# Patient Record
Sex: FEMALE | Race: WHITE | Employment: UNEMPLOYED | URBAN - METROPOLITAN AREA
[De-identification: names, ages, dates, MRNs, and addresses within clinical notes are randomized per-mention and may not be internally consistent; named-entity substitution may affect disease eponyms.]

---

## 2020-10-13 ENCOUNTER — TRANSCRIBE ORDERS (OUTPATIENT)
Dept: ADMINISTRATIVE | Facility: HOSPITAL | Age: 60
End: 2020-10-13

## 2020-10-13 DIAGNOSIS — N28.9 KIDNEY DISEASE: Primary | ICD-10-CM

## 2020-10-19 ENCOUNTER — HOSPITAL ENCOUNTER (OUTPATIENT)
Dept: ULTRASOUND IMAGING | Facility: HOSPITAL | Age: 60
Discharge: HOME | End: 2020-10-19
Payer: COMMERCIAL

## 2020-10-19 DIAGNOSIS — N28.9 KIDNEY DISEASE: ICD-10-CM

## 2020-10-19 PROCEDURE — 51798 US URINE CAPACITY MEASURE: CPT

## 2021-04-06 DIAGNOSIS — Z23 ENCOUNTER FOR IMMUNIZATION: ICD-10-CM

## 2021-04-13 ENCOUNTER — IMMUNIZATIONS (OUTPATIENT)
Dept: FAMILY MEDICINE CLINIC | Facility: HOSPITAL | Age: 61
End: 2021-04-13

## 2021-04-13 DIAGNOSIS — Z23 ENCOUNTER FOR IMMUNIZATION: Primary | ICD-10-CM

## 2021-04-13 PROCEDURE — 91300 SARS-COV-2 / COVID-19 MRNA VACCINE (PFIZER-BIONTECH) 30 MCG: CPT

## 2021-04-13 PROCEDURE — 0001A SARS-COV-2 / COVID-19 MRNA VACCINE (PFIZER-BIONTECH) 30 MCG: CPT

## 2021-05-05 ENCOUNTER — IMMUNIZATIONS (OUTPATIENT)
Dept: FAMILY MEDICINE CLINIC | Facility: HOSPITAL | Age: 61
End: 2021-05-05

## 2021-05-05 DIAGNOSIS — Z23 ENCOUNTER FOR IMMUNIZATION: Primary | ICD-10-CM

## 2021-05-05 PROCEDURE — 91300 SARS-COV-2 / COVID-19 MRNA VACCINE (PFIZER-BIONTECH) 30 MCG: CPT

## 2021-05-05 PROCEDURE — 0002A SARS-COV-2 / COVID-19 MRNA VACCINE (PFIZER-BIONTECH) 30 MCG: CPT

## 2021-09-20 ENCOUNTER — HOSPITAL ENCOUNTER (OUTPATIENT)
Dept: RADIOLOGY | Facility: HOSPITAL | Age: 61
Discharge: HOME | End: 2021-09-20
Attending: FAMILY MEDICINE
Payer: COMMERCIAL

## 2021-09-20 DIAGNOSIS — J40 BRONCHITIS: ICD-10-CM

## 2021-09-20 PROCEDURE — 71046 X-RAY EXAM CHEST 2 VIEWS: CPT

## 2023-01-18 ENCOUNTER — APPOINTMENT (EMERGENCY)
Dept: RADIOLOGY | Facility: HOSPITAL | Age: 63
End: 2023-01-18

## 2023-01-18 ENCOUNTER — HOSPITAL ENCOUNTER (INPATIENT)
Facility: HOSPITAL | Age: 63
LOS: 2 days | Discharge: HOME | End: 2023-01-22
Attending: EMERGENCY MEDICINE | Admitting: INTERNAL MEDICINE

## 2023-01-18 DIAGNOSIS — R93.89 ABNORMAL CT OF THE CHEST: ICD-10-CM

## 2023-01-18 DIAGNOSIS — E87.1 HYPONATREMIA: Primary | ICD-10-CM

## 2023-01-18 DIAGNOSIS — F20.9 SCHIZOPHRENIA (HCC): ICD-10-CM

## 2023-01-18 DIAGNOSIS — I10 BENIGN ESSENTIAL HYPERTENSION: ICD-10-CM

## 2023-01-18 DIAGNOSIS — E83.42 HYPOMAGNESEMIA: ICD-10-CM

## 2023-01-18 PROBLEM — E78.00 HYPERCHOLESTEROLEMIA: Status: ACTIVE | Noted: 2022-12-13

## 2023-01-18 PROBLEM — E66.01 CLASS 3 SEVERE OBESITY DUE TO EXCESS CALORIES WITHOUT SERIOUS COMORBIDITY WITH BODY MASS INDEX (BMI) OF 40.0 TO 44.9 IN ADULT (HCC): Status: ACTIVE | Noted: 2022-12-13

## 2023-01-18 PROBLEM — E66.813 CLASS 3 SEVERE OBESITY DUE TO EXCESS CALORIES WITHOUT SERIOUS COMORBIDITY WITH BODY MASS INDEX (BMI) OF 40.0 TO 44.9 IN ADULT (HCC): Status: ACTIVE | Noted: 2022-12-13

## 2023-01-18 PROBLEM — M19.90 ARTHRITIS: Status: ACTIVE | Noted: 2020-10-13

## 2023-01-18 PROBLEM — N18.31 STAGE 3A CHRONIC KIDNEY DISEASE (HCC): Status: ACTIVE | Noted: 2020-10-13

## 2023-01-18 LAB
ALBUMIN SERPL BCP-MCNC: 4.2 G/DL (ref 3.5–5)
ALP SERPL-CCNC: 83 U/L (ref 34–104)
ALT SERPL W P-5'-P-CCNC: 11 U/L (ref 7–52)
ANION GAP SERPL CALCULATED.3IONS-SCNC: 10 MMOL/L (ref 4–13)
ANION GAP SERPL CALCULATED.3IONS-SCNC: 12 MMOL/L (ref 4–13)
AST SERPL W P-5'-P-CCNC: 13 U/L (ref 13–39)
ATRIAL RATE: 65 BPM
BACTERIA UR QL AUTO: NORMAL /HPF
BASOPHILS # BLD AUTO: 0.06 THOUSANDS/ÂΜL (ref 0–0.1)
BASOPHILS NFR BLD AUTO: 1 % (ref 0–1)
BILIRUB SERPL-MCNC: 0.43 MG/DL (ref 0.2–1)
BILIRUB UR QL STRIP: NEGATIVE
BUN SERPL-MCNC: 18 MG/DL (ref 5–25)
BUN SERPL-MCNC: 19 MG/DL (ref 5–25)
CALCIUM SERPL-MCNC: 9 MG/DL (ref 8.4–10.2)
CALCIUM SERPL-MCNC: 9.9 MG/DL (ref 8.4–10.2)
CARDIAC TROPONIN I PNL SERPL HS: 4 NG/L
CHLORIDE SERPL-SCNC: 89 MMOL/L (ref 96–108)
CHLORIDE SERPL-SCNC: 92 MMOL/L (ref 96–108)
CLARITY UR: CLEAR
CO2 SERPL-SCNC: 21 MMOL/L (ref 21–32)
CO2 SERPL-SCNC: 21 MMOL/L (ref 21–32)
COLOR UR: YELLOW
CREAT SERPL-MCNC: 1.75 MG/DL (ref 0.6–1.3)
CREAT SERPL-MCNC: 1.76 MG/DL (ref 0.6–1.3)
EOSINOPHIL # BLD AUTO: 0.08 THOUSAND/ÂΜL (ref 0–0.61)
EOSINOPHIL NFR BLD AUTO: 1 % (ref 0–6)
ERYTHROCYTE [DISTWIDTH] IN BLOOD BY AUTOMATED COUNT: 12.8 % (ref 11.6–15.1)
GFR SERPL CREATININE-BSD FRML MDRD: 30 ML/MIN/1.73SQ M
GFR SERPL CREATININE-BSD FRML MDRD: 30 ML/MIN/1.73SQ M
GLUCOSE SERPL-MCNC: 114 MG/DL (ref 65–140)
GLUCOSE SERPL-MCNC: 122 MG/DL (ref 65–140)
GLUCOSE UR STRIP-MCNC: NEGATIVE MG/DL
HCT VFR BLD AUTO: 37.6 % (ref 34.8–46.1)
HGB BLD-MCNC: 13 G/DL (ref 11.5–15.4)
HGB UR QL STRIP.AUTO: ABNORMAL
IMM GRANULOCYTES # BLD AUTO: 0.03 THOUSAND/UL (ref 0–0.2)
IMM GRANULOCYTES NFR BLD AUTO: 0 % (ref 0–2)
KETONES UR STRIP-MCNC: NEGATIVE MG/DL
LEUKOCYTE ESTERASE UR QL STRIP: NEGATIVE
LYMPHOCYTES # BLD AUTO: 1.56 THOUSANDS/ÂΜL (ref 0.6–4.47)
LYMPHOCYTES NFR BLD AUTO: 16 % (ref 14–44)
MAGNESIUM SERPL-MCNC: 1.6 MG/DL (ref 1.9–2.7)
MCH RBC QN AUTO: 28.5 PG (ref 26.8–34.3)
MCHC RBC AUTO-ENTMCNC: 34.6 G/DL (ref 31.4–37.4)
MCV RBC AUTO: 83 FL (ref 82–98)
MONOCYTES # BLD AUTO: 0.79 THOUSAND/ÂΜL (ref 0.17–1.22)
MONOCYTES NFR BLD AUTO: 8 % (ref 4–12)
NEUTROPHILS # BLD AUTO: 7.01 THOUSANDS/ÂΜL (ref 1.85–7.62)
NEUTS SEG NFR BLD AUTO: 74 % (ref 43–75)
NITRITE UR QL STRIP: NEGATIVE
NON-SQ EPI CELLS URNS QL MICRO: NORMAL /HPF
NRBC BLD AUTO-RTO: 0 /100 WBCS
OSMOLALITY UR/SERPL-RTO: 262 MMOL/KG (ref 282–298)
OSMOLALITY UR: 186 MMOL/KG
P AXIS: 40 DEGREES
PH UR STRIP.AUTO: 6 [PH]
PHOSPHATE SERPL-MCNC: 3.6 MG/DL (ref 2.3–4.1)
PLATELET # BLD AUTO: 341 THOUSANDS/UL (ref 149–390)
PLATELET # BLD AUTO: 414 THOUSANDS/UL (ref 149–390)
PMV BLD AUTO: 8.7 FL (ref 8.9–12.7)
PMV BLD AUTO: 8.7 FL (ref 8.9–12.7)
POTASSIUM SERPL-SCNC: 3.5 MMOL/L (ref 3.5–5.3)
POTASSIUM SERPL-SCNC: 4.2 MMOL/L (ref 3.5–5.3)
PR INTERVAL: 138 MS
PROT SERPL-MCNC: 7.4 G/DL (ref 6.4–8.4)
PROT UR STRIP-MCNC: NEGATIVE MG/DL
QRS AXIS: -12 DEGREES
QRSD INTERVAL: 152 MS
QT INTERVAL: 450 MS
QTC INTERVAL: 468 MS
RBC # BLD AUTO: 4.56 MILLION/UL (ref 3.81–5.12)
RBC #/AREA URNS AUTO: NORMAL /HPF
SODIUM 24H UR-SCNC: 9 MOL/L
SODIUM SERPL-SCNC: 122 MMOL/L (ref 135–147)
SODIUM SERPL-SCNC: 123 MMOL/L (ref 135–147)
SP GR UR STRIP.AUTO: <=1.005 (ref 1–1.03)
T WAVE AXIS: 32 DEGREES
TSH SERPL DL<=0.05 MIU/L-ACNC: 1.99 UIU/ML (ref 0.45–4.5)
URATE SERPL-MCNC: 4.5 MG/DL (ref 2–7.5)
UROBILINOGEN UR QL STRIP.AUTO: 0.2 E.U./DL
VENTRICULAR RATE: 65 BPM
WBC # BLD AUTO: 9.53 THOUSAND/UL (ref 4.31–10.16)
WBC #/AREA URNS AUTO: NORMAL /HPF

## 2023-01-18 RX ORDER — OLANZAPINE 2.5 MG/1
7.5 TABLET ORAL
Status: DISCONTINUED | OUTPATIENT
Start: 2023-01-18 | End: 2023-01-22 | Stop reason: HOSPADM

## 2023-01-18 RX ORDER — ONDANSETRON 2 MG/ML
4 INJECTION INTRAMUSCULAR; INTRAVENOUS EVERY 6 HOURS PRN
Status: DISCONTINUED | OUTPATIENT
Start: 2023-01-18 | End: 2023-01-22 | Stop reason: HOSPADM

## 2023-01-18 RX ORDER — FLUOXETINE HYDROCHLORIDE 40 MG/1
40 CAPSULE ORAL DAILY
COMMUNITY
End: 2023-01-22

## 2023-01-18 RX ORDER — MAGNESIUM SULFATE HEPTAHYDRATE 40 MG/ML
2 INJECTION, SOLUTION INTRAVENOUS ONCE
Status: COMPLETED | OUTPATIENT
Start: 2023-01-18 | End: 2023-01-18

## 2023-01-18 RX ORDER — ENOXAPARIN SODIUM 100 MG/ML
60 INJECTION SUBCUTANEOUS 2 TIMES DAILY
Status: DISCONTINUED | OUTPATIENT
Start: 2023-01-18 | End: 2023-01-18

## 2023-01-18 RX ORDER — FLUOXETINE HYDROCHLORIDE 20 MG/1
20 CAPSULE ORAL DAILY
Status: DISCONTINUED | OUTPATIENT
Start: 2023-01-19 | End: 2023-01-21

## 2023-01-18 RX ORDER — METOPROLOL SUCCINATE 100 MG/1
100 TABLET, EXTENDED RELEASE ORAL DAILY
COMMUNITY

## 2023-01-18 RX ORDER — ACETAMINOPHEN 325 MG/1
650 TABLET ORAL EVERY 6 HOURS PRN
Status: DISCONTINUED | OUTPATIENT
Start: 2023-01-18 | End: 2023-01-22 | Stop reason: HOSPADM

## 2023-01-18 RX ORDER — ALPRAZOLAM 0.5 MG/1
1 TABLET ORAL 3 TIMES DAILY PRN
Status: DISCONTINUED | OUTPATIENT
Start: 2023-01-18 | End: 2023-01-22 | Stop reason: HOSPADM

## 2023-01-18 RX ORDER — OLANZAPINE 7.5 MG/1
7.5 TABLET ORAL
COMMUNITY

## 2023-01-18 RX ORDER — ALPRAZOLAM 1 MG/1
1 TABLET ORAL 3 TIMES DAILY PRN
COMMUNITY

## 2023-01-18 RX ORDER — PRAVASTATIN SODIUM 40 MG
40 TABLET ORAL
Status: DISCONTINUED | OUTPATIENT
Start: 2023-01-18 | End: 2023-01-22 | Stop reason: HOSPADM

## 2023-01-18 RX ORDER — METOPROLOL SUCCINATE 100 MG/1
100 TABLET, EXTENDED RELEASE ORAL DAILY
Status: DISCONTINUED | OUTPATIENT
Start: 2023-01-19 | End: 2023-01-22 | Stop reason: HOSPADM

## 2023-01-18 RX ORDER — BENZTROPINE MESYLATE 1 MG/1
0.5 TABLET ORAL 2 TIMES DAILY PRN
Status: DISCONTINUED | OUTPATIENT
Start: 2023-01-18 | End: 2023-01-22 | Stop reason: HOSPADM

## 2023-01-18 RX ORDER — DOCUSATE SODIUM 100 MG/1
100 CAPSULE, LIQUID FILLED ORAL 2 TIMES DAILY
Status: DISCONTINUED | OUTPATIENT
Start: 2023-01-18 | End: 2023-01-22 | Stop reason: HOSPADM

## 2023-01-18 RX ORDER — SODIUM CHLORIDE 9 MG/ML
75 INJECTION, SOLUTION INTRAVENOUS CONTINUOUS
Status: DISCONTINUED | OUTPATIENT
Start: 2023-01-18 | End: 2023-01-19

## 2023-01-18 RX ORDER — AMLODIPINE BESYLATE 10 MG/1
10 TABLET ORAL DAILY
Status: DISCONTINUED | OUTPATIENT
Start: 2023-01-19 | End: 2023-01-19

## 2023-01-18 RX ORDER — MELATONIN
2000 DAILY
COMMUNITY

## 2023-01-18 RX ORDER — ENOXAPARIN SODIUM 100 MG/ML
40 INJECTION SUBCUTANEOUS EVERY 12 HOURS SCHEDULED
Status: DISCONTINUED | OUTPATIENT
Start: 2023-01-18 | End: 2023-01-22 | Stop reason: HOSPADM

## 2023-01-18 RX ADMIN — SODIUM CHLORIDE 75 ML/HR: 0.9 INJECTION, SOLUTION INTRAVENOUS at 15:11

## 2023-01-18 RX ADMIN — ALPRAZOLAM 1 MG: 0.5 TABLET ORAL at 22:58

## 2023-01-18 RX ADMIN — MAGNESIUM SULFATE HEPTAHYDRATE 2 G: 40 INJECTION, SOLUTION INTRAVENOUS at 18:24

## 2023-01-18 RX ADMIN — DOCUSATE SODIUM 100 MG: 100 CAPSULE, LIQUID FILLED ORAL at 18:18

## 2023-01-18 RX ADMIN — ENOXAPARIN SODIUM 40 MG: 40 INJECTION SUBCUTANEOUS at 22:59

## 2023-01-18 RX ADMIN — PRAVASTATIN SODIUM 40 MG: 40 TABLET ORAL at 18:18

## 2023-01-18 RX ADMIN — OLANZAPINE 7.5 MG: 2.5 TABLET, FILM COATED ORAL at 22:58

## 2023-01-18 NOTE — ED PROVIDER NOTES
History  Chief Complaint   Patient presents with   • Abnormal Lab     Pt presents to ed via walk in with complaint of having out pt blood work done and her sodium level being low      61-year-old female comes in for evaluation of abnormal labs  Patient was sent for outpatient labs by nephrologist   Her sodium was reported as 123  Patient states that she was told to come to the emergency department  Patient's only complaint is of muscle cramps but states that this is not new  She denies any headache blurred vision chest pain shortness of breath nausea vomiting or diarrhea  No abdominal pain  Patient states she has had low sodium before but never this low  History provided by:  Patient and medical records   used: No    Medical Problem  Location:  Patient sent in for hyponatremia  Quality:  Low  Severity:  Moderate  Onset quality:  Sudden  Timing:  Constant  Progression:  Worsening  Chronicity:  New  Context:  History of chronic kidney disease  Ineffective treatments:  None tried  Associated symptoms: fatigue and myalgias    Associated symptoms: no abdominal pain, no chest pain, no cough, no ear pain, no fever, no rash, no rhinorrhea, no shortness of breath, no sore throat and no vomiting    Associated symptoms comment:  Muscle cramps      None       Past Medical History:   Diagnosis Date   • Depression    • High cholesterol    • Hypertension    • Psychiatric disorder    • Renal disorder        Past Surgical History:   Procedure Laterality Date   • HYSTERECTOMY         History reviewed  No pertinent family history  I have reviewed and agree with the history as documented      E-Cigarette/Vaping   • E-Cigarette Use Never User      E-Cigarette/Vaping Substances     Social History     Tobacco Use   • Smoking status: Former     Packs/day: 3 00     Types: Cigarettes     Quit date:      Years since quittin 0   • Smokeless tobacco: Never   Vaping Use   • Vaping Use: Never used Substance Use Topics   • Alcohol use: Never   • Drug use: Never       Review of Systems   Constitutional: Positive for fatigue  Negative for chills and fever  HENT: Negative for ear pain, rhinorrhea and sore throat  Eyes: Negative for pain and visual disturbance  Respiratory: Negative for cough and shortness of breath  Cardiovascular: Negative for chest pain and palpitations  Gastrointestinal: Negative for abdominal pain and vomiting  Genitourinary: Negative for dysuria and hematuria  Musculoskeletal: Positive for myalgias  Negative for arthralgias and back pain  Skin: Negative for color change and rash  Neurological: Negative for seizures and syncope  All other systems reviewed and are negative  Physical Exam  Physical Exam  Vitals and nursing note reviewed  Constitutional:       Appearance: Normal appearance  She is well-developed  She is not toxic-appearing  HENT:      Head: Normocephalic and atraumatic  Right Ear: Tympanic membrane and external ear normal       Left Ear: Tympanic membrane and external ear normal       Nose: Nose normal  No nasal deformity or rhinorrhea  Mouth/Throat:      Dentition: Normal dentition  Pharynx: Uvula midline  Eyes:      General: Lids are normal          Right eye: No discharge  Left eye: No discharge  Conjunctiva/sclera: Conjunctivae normal       Pupils: Pupils are equal, round, and reactive to light  Neck:      Vascular: No carotid bruit or JVD  Trachea: Trachea normal    Cardiovascular:      Rate and Rhythm: Normal rate and regular rhythm  No extrasystoles are present  Chest Wall: PMI is not displaced  Pulses: Normal pulses  Pulmonary:      Effort: Pulmonary effort is normal  No accessory muscle usage or respiratory distress  Breath sounds: Normal breath sounds  No wheezing, rhonchi or rales  Abdominal:      General: Bowel sounds are normal       Palpations: Abdomen is soft   Abdomen is not rigid  There is no mass  Tenderness: There is no abdominal tenderness  There is no guarding or rebound  Musculoskeletal:      Right shoulder: No swelling, deformity or bony tenderness  Normal range of motion  Cervical back: Normal range of motion and neck supple  No deformity, tenderness or bony tenderness  Lymphadenopathy:      Cervical: No cervical adenopathy  Skin:     General: Skin is warm and dry  Findings: No rash  Neurological:      Mental Status: She is alert and oriented to person, place, and time  GCS: GCS eye subscore is 4  GCS verbal subscore is 5  GCS motor subscore is 6  Cranial Nerves: No cranial nerve deficit  Sensory: No sensory deficit  Deep Tendon Reflexes: Reflexes are normal and symmetric     Psychiatric:         Speech: Speech normal          Behavior: Behavior normal          Vital Signs  ED Triage Vitals [01/18/23 1410]   Temperature Pulse Respirations Blood Pressure SpO2   97 5 °F (36 4 °C) 65 20 157/58 96 %      Temp Source Heart Rate Source Patient Position - Orthostatic VS BP Location FiO2 (%)   Oral Monitor Sitting Left arm --      Pain Score       No Pain           Vitals:    01/18/23 1410 01/18/23 1515   BP: 157/58 152/84   Pulse: 65 68   Patient Position - Orthostatic VS: Sitting          Visual Acuity      ED Medications  Medications   sodium chloride 0 9 % infusion (75 mL/hr Intravenous New Bag 1/18/23 1511)       Diagnostic Studies  Results Reviewed     Procedure Component Value Units Date/Time    Urine Microscopic [[id number]]  (Normal) Collected: 01/18/23 1444    Lab Status: Final result Specimen: Urine, Clean Catch Updated: 01/18/23 1516     RBC, UA 0-1 /hpf      WBC, UA 0-1 /hpf      Epithelial Cells Occasional /hpf      Bacteria, UA Occasional /hpf     BMP Q8 hours X 3 (Hyponatremia monitoring) [[id number]]     Lab Status: No result Specimen: Blood     UA w Reflex to Microscopic w Reflex to Culture [[id number]]  (Abnormal) Collected: 01/18/23 1444    Lab Status: Final result Specimen: Urine, Clean Catch Updated: 01/18/23 1451     Color, UA Yellow     Clarity, UA Clear     Specific Gravity, UA <=1 005     pH, UA 6 0     Leukocytes, UA Negative     Nitrite, UA Negative     Protein, UA Negative mg/dl      Glucose, UA Negative mg/dl      Ketones, UA Negative mg/dl      Urobilinogen, UA 0 2 E U /dl      Bilirubin, UA Negative     Occult Blood, UA 2+    HS Troponin 0hr (reflex protocol) [[id number]]  (Normal) Collected: 01/18/23 1413    Lab Status: Final result Specimen: Blood from Arm, Right Updated: 01/18/23 1445     hs TnI 0hr 4 ng/L     Comprehensive metabolic panel [[id number]]  (Abnormal) Collected: 01/18/23 1413    Lab Status: Final result Specimen: Blood from Arm, Right Updated: 01/18/23 1440     Sodium 122 mmol/L      Potassium 4 2 mmol/L      Chloride 89 mmol/L      CO2 21 mmol/L      ANION GAP 12 mmol/L      BUN 18 mg/dL      Creatinine 1 76 mg/dL      Glucose 114 mg/dL      Calcium 9 9 mg/dL      AST 13 U/L      ALT 11 U/L      Alkaline Phosphatase 83 U/L      Total Protein 7 4 g/dL      Albumin 4 2 g/dL      Total Bilirubin 0 43 mg/dL      eGFR 30 ml/min/1 73sq m     Narrative:      [place] guidelines for Chronic Kidney Disease (CKD):   •  Stage 1 with normal or high GFR (GFR > 90 mL/min/1 73 square meters)  •  Stage 2 Mild CKD (GFR = 60-89 mL/min/1 73 square meters)  •  Stage 3A Moderate CKD (GFR = 45-59 mL/min/1 73 square meters)  •  Stage 3B Moderate CKD (GFR = 30-44 mL/min/1 73 square meters)  •  Stage 4 Severe CKD (GFR = 15-29 mL/min/1 73 square meters)  •  Stage 5 End Stage CKD (GFR <15 mL/min/1 73 square meters)  Note: GFR calculation is accurate only with a steady state creatinine    Phosphorus [[id number]]  (Normal) Collected: 01/18/23 1413    Lab Status: Final result Specimen: Blood from Arm, Right Updated: 01/18/23 1440     Phosphorus 3 6 mg/dL     Magnesium [[id number]]  (Abnormal) Collected: 01/18/23 1413    Lab Status: Final result Specimen: Blood from Arm, Right Updated: 01/18/23 1440     Magnesium 1 6 mg/dL     CBC and differential [[id number]]  (Abnormal) Collected: 01/18/23 1413    Lab Status: Final result Specimen: Blood from Arm, Right Updated: 01/18/23 1419     WBC 9 53 Thousand/uL      RBC 4 56 Million/uL      Hemoglobin 13 0 g/dL      Hematocrit 37 6 %      MCV 83 fL      MCH 28 5 pg      MCHC 34 6 g/dL      RDW 12 8 %      MPV 8 7 fL      Platelets 329 Thousands/uL      nRBC 0 /100 WBCs      Neutrophils Relative 74 %      Immat GRANS % 0 %      Lymphocytes Relative 16 %      Monocytes Relative 8 %      Eosinophils Relative 1 %      Basophils Relative 1 %      Neutrophils Absolute 7 01 Thousands/µL      Immature Grans Absolute 0 03 Thousand/uL      Lymphocytes Absolute 1 56 Thousands/µL      Monocytes Absolute 0 79 Thousand/µL      Eosinophils Absolute 0 08 Thousand/µL      Basophils Absolute 0 06 Thousands/µL                  XR chest 1 view portable    (Results Pending)              Procedures  ECG 12 Lead Documentation Only    Date/Time: 1/18/2023 2:16 PM  Performed by: Nara Martínez DO  Authorized by: Nara Martínez DO     Patient location:  ED  Rate:     ECG rate:  65  Rhythm:     Rhythm: sinus rhythm    Ectopy:     Ectopy: none    QRS:     QRS axis:  Normal  Conduction:     Conduction: abnormal      Abnormal conduction: complete RBBB    ST segments:     ST segments:  Normal             ED Course                               SBIRT 22yo+    Flowsheet Row Most Recent Value   SBIRT (23 yo +)    In order to provide better care to our patients, we are screening all of our patients for alcohol and drug use  Would it be okay to ask you these screening questions?  No Filed at: 01/18/2023 0049                    Medical Decision Making  Differential diagnosis includes but is not limited to electrolyte abnormality, LAILA, dehydration     Chronic conditions affecting care: chronic kidney disease    Hypomagnesemia: complicated acute illness or injury  Hyponatremia: complicated acute illness or injury  Amount and/or Complexity of Data Reviewed  External Data Reviewed: notes  Details: Reviewed outpatient blood work notes from nephrology sodium 123 on 117 was told to come to the ED immediately patient stated she did not want to come until tomorrow however did present today for evaluation  Labs: ordered  Details: Hyponatremia and hypomagnesia  ECG/medicine tests: ordered and independent interpretation performed  Decision-making details documented in ED Course  Risk  Prescription drug management  Decision regarding hospitalization  Risk Details: Discussed case with internal medicine physician Dr Willis Chaudhary  We decided patient needed to come in for further evaluation and treatment  Disposition  Final diagnoses:   Hyponatremia   Hypomagnesemia     Time reflects when diagnosis was documented in both MDM as applicable and the Disposition within this note     Time User Action Codes Description Comment    1/18/2023  3:12 PM Santo Hampton [E87 1] Hyponatremia     1/18/2023  3:14 PM Nas JOHNS Modify [E87 1] Hyponatremia     1/18/2023  3:14 PM Archana Adrian Add [E83 42] Hypomagnesemia       ED Disposition     ED Disposition   Admit    Condition   Stable    Date/Time   Wed Jan 18, 2023  3:14 PM    Comment   Case was discussed with dr Willis Chaudhary and the patient's admission status was agreed to be Admission Status: observation status to the service of Dr Willis Chaudhary   Follow-up Information    None         Patient's Medications    No medications on file       No discharge procedures on file      PDMP Review     None          ED Provider  Electronically Signed by           Kirstie Franks DO  01/18/23 2804

## 2023-01-18 NOTE — ASSESSMENT & PLAN NOTE
· Hyponatremia likely secondary to increased free fluid intake and decreased solute intake  Patient however appears having slightly dry mucous oral mucosa  Will give IV fluid hydration with normal saline at 75 cc/h and check BMP every 6 hours  Consult nephrology  Will get hyponatremia labs with TSH and cortisol and urine and serum osmolality and urine sodium  · Will place on free fluid restriction of 1200 mL  · Patient also has been chronically on fluoxetine which can contribute to hyponatremia  Will decrease fluoxetine dose from 40 mg daily to 20 mg daily

## 2023-01-18 NOTE — ASSESSMENT & PLAN NOTE
Lab Results   Component Value Date    EGFR 30 01/18/2023    CREATININE 1 76 (H) 01/18/2023   Mild acute kidney injury on chronic kidney disease with baseline creatinine around 1 5  Will give IV fluid hydration and monitor BUNs/creatinine, avoid nephrotoxins

## 2023-01-18 NOTE — H&P
Olena U  66   H&P- Braulio Davila 1960, 58 y o  female MRN: [id number]  Unit/Bed#: -01 Encounter: [id number]  Primary Care Provider: Pritesh Young MD   Date and time admitted to hospital: 1/18/2023  1:59 PM    Stage 3a chronic kidney disease [facility])  Assessment & Plan  Lab Results   Component Value Date    EGFR 30 01/18/2023    CREATININE 1 76 (H) 01/18/2023   Mild acute kidney injury on chronic kidney disease with baseline creatinine around 1 5  Will give IV fluid hydration and monitor BUNs/creatinine, avoid nephrotoxins  Hypercholesterolemia  Assessment & Plan  Continue rosuvastatin    Class 3 severe obesity due to excess calories without serious comorbidity with body mass index (BMI) of 40 0 to 44 9 in adult [facility])  Assessment & Plan  Weight reduction and lifestyle modification advised  Benign essential hypertension  Assessment & Plan  Continue metoprolol and amlodipine  Arthritis  Assessment & Plan  Continue supportive care,    * Hyponatremia  Assessment & Plan  · Hyponatremia likely secondary to increased free fluid intake and decreased solute intake  Patient however appears having slightly dry mucous oral mucosa  Will give IV fluid hydration with normal saline at 75 cc/h and check BMP every 6 hours  Consult nephrology  Will get hyponatremia labs with TSH and cortisol and urine and serum osmolality and urine sodium  · Will place on free fluid restriction of 1200 mL  · Patient also has been chronically on fluoxetine which can contribute to hyponatremia  Will decrease fluoxetine dose from 40 mg daily to 20 mg daily  VTE Pharmacologic Prophylaxis:   Moderate Risk (Score 3-4) - Pharmacological DVT Prophylaxis Ordered: enoxaparin (Lovenox)  Code Status: Level 1 - Full Code   Discussion with family: Patient declined call to        Anticipated Length of Stay: Patient will be admitted on an observation basis with an anticipated length of stay of less than 2 midnights secondary to hyponatremia   Total Time for Visit, including Counseling / Coordination of Care: 60 minutes Greater than 50% of this total time spent on direct patient counseling and coordination of care  Chief Complaint: abnormal labs     History of Present Illness:  Marina Valenzuela is a 58 y o  female with a PMH of and hypertension, anxiety, dyslipidemia, depression, presents with complaints of abnormal labs  Patient admits to have been drinking a lot of water at least 1 gallon more than a gallon for many years  Patient has always been told by her nephrologist Dr Wilda Pandey to cut down on the fluids  Patient denies of any dizziness or headache or nausea  Denies of vomiting or abdominal pain  Denies of fever chills or cough  Patient denies of any chest pain or shortness of breath  Patient states that she felt slightly weak and had blood work done yesterday and showed sodium of 123 and she was asked to come into the ED  Today sodium was noted to be 122 and started on IV fluid normal saline in the ED  Patient states that she always has a dry mouth and denies of drinking alcohol on a regular basis  Patient denies of weight loss or loss of appetite  Review of Systems:  Review of Systems   Constitutional: Positive for appetite change and fatigue  All other systems reviewed and are negative  Past Medical and Surgical History:   Past Medical History:   Diagnosis Date   • Depression    • High cholesterol    • Hypertension    • Psychiatric disorder    • Renal disorder        Past Surgical History:   Procedure Laterality Date   • HYSTERECTOMY         Meds/Allergies:  Prior to Admission medications    Not on File     I have reviewed home medications with patient personally  Allergies:    Allergies   Allergen Reactions   • Benadryl [Diphenhydramine] Shortness Of Breath       Social History:  Marital Status: Single   Occupation: disability  Patient Pre-hospital Living Situation: Apartment  Patient Pre-hospital Level of Mobility: walks with walker  Patient Pre-hospital Diet Restrictions: nil  Substance Use History:   Social History     Substance and Sexual Activity   Alcohol Use Never     Social History     Tobacco Use   Smoking Status Former   • Packs/day: 3 00   • Types: Cigarettes   • Quit date:    • Years since quittin 0   Smokeless Tobacco Never     Social History     Substance and Sexual Activity   Drug Use Never       Family History:  History reviewed  No pertinent family history  Physical Exam:     Vitals:   Blood Pressure: 159/73 (23)  Pulse: 64 (23)  Temperature: (!) 97 1 °F (36 2 °C) (23)  Temp Source: Tympanic (23)  Respirations: 18 (23)  Height: 5' 4" (162 6 cm) (23)  Weight - Scale: 115 kg (254 lb 0 2 oz) (23)  SpO2: 96 % (23)    Physical Exam   HEENT-PERRLA, dry  oral mucosa  Neck-supple, no JVD elevation   Respiratory-equal air entry bilaterally, no rales or rhonchi  Cardiovascular system-S1, S2 heard, no murmur or gallops or rubs  Abdomen-soft, nontender, no guarding or rigidity, bowel sounds heard  Extremities-no pedal edema  Peripheral pulses palpable  Musculoskeletal-no contractures  Central nervous system-no acute focal neurological deficit ,no sensory or motor deficit noted    Skin-no rash noted        Additional Data:     Lab Results:  Results from last 7 days   Lab Units 23  1413   WBC Thousand/uL 9 53   HEMOGLOBIN g/dL 13 0   HEMATOCRIT % 37 6   PLATELETS Thousands/uL 414*   NEUTROS PCT % 74   LYMPHS PCT % 16   MONOS PCT % 8   EOS PCT % 1     Results from last 7 days   Lab Units 23  1413   SODIUM mmol/L 122*   POTASSIUM mmol/L 4 2   CHLORIDE mmol/L 89*   CO2 mmol/L 21   BUN mg/dL 18   CREATININE mg/dL 1 76*   ANION GAP mmol/L 12   CALCIUM mg/dL 9 9   ALBUMIN g/dL 4 2   TOTAL BILIRUBIN mg/dL 0 43   ALK PHOS U/L 83   ALT U/L 11   AST U/L 13   GLUCOSE RANDOM mg/dL 114                       Lines/Drains:  Invasive Devices     Peripheral Intravenous Line  Duration           Peripheral IV 01/18/23 Dorsal (posterior); Right Hand <1 day                    Imaging: Reviewed radiology reports from this admission including: chest xray  XR chest 1 view portable   Final Result by Patrick Mcnamara MD (01/18 1643)      No acute cardiopulmonary disease  Workstation performed: [id number]             EKG and Other Studies Reviewed on Admission:   · EKG: NSR  HR Normal sinus rhythm, no acute ST-T changes,     ** Please Note: This note has been constructed using a voice recognition system   **

## 2023-01-19 ENCOUNTER — APPOINTMENT (OUTPATIENT)
Dept: CT IMAGING | Facility: HOSPITAL | Age: 63
End: 2023-01-19

## 2023-01-19 LAB
ANION GAP SERPL CALCULATED.3IONS-SCNC: 10 MMOL/L (ref 4–13)
ANION GAP SERPL CALCULATED.3IONS-SCNC: 11 MMOL/L (ref 4–13)
ANION GAP SERPL CALCULATED.3IONS-SCNC: 8 MMOL/L (ref 4–13)
ANION GAP SERPL CALCULATED.3IONS-SCNC: 9 MMOL/L (ref 4–13)
ANION GAP SERPL CALCULATED.3IONS-SCNC: 9 MMOL/L (ref 4–13)
BUN SERPL-MCNC: 14 MG/DL (ref 5–25)
BUN SERPL-MCNC: 14 MG/DL (ref 5–25)
BUN SERPL-MCNC: 15 MG/DL (ref 5–25)
BUN SERPL-MCNC: 15 MG/DL (ref 5–25)
BUN SERPL-MCNC: 18 MG/DL (ref 5–25)
CALCIUM SERPL-MCNC: 8.5 MG/DL (ref 8.4–10.2)
CALCIUM SERPL-MCNC: 8.7 MG/DL (ref 8.4–10.2)
CALCIUM SERPL-MCNC: 9.3 MG/DL (ref 8.4–10.2)
CALCIUM SERPL-MCNC: 9.4 MG/DL (ref 8.4–10.2)
CALCIUM SERPL-MCNC: 9.5 MG/DL (ref 8.4–10.2)
CHLORIDE SERPL-SCNC: 92 MMOL/L (ref 96–108)
CHLORIDE SERPL-SCNC: 92 MMOL/L (ref 96–108)
CHLORIDE SERPL-SCNC: 94 MMOL/L (ref 96–108)
CHLORIDE SERPL-SCNC: 94 MMOL/L (ref 96–108)
CHLORIDE SERPL-SCNC: 97 MMOL/L (ref 96–108)
CO2 SERPL-SCNC: 22 MMOL/L (ref 21–32)
CO2 SERPL-SCNC: 23 MMOL/L (ref 21–32)
CORTIS AM PEAK SERPL-MCNC: 30.8 UG/DL (ref 4.2–22.4)
CREAT SERPL-MCNC: 1.23 MG/DL (ref 0.6–1.3)
CREAT SERPL-MCNC: 1.3 MG/DL (ref 0.6–1.3)
CREAT SERPL-MCNC: 1.35 MG/DL (ref 0.6–1.3)
CREAT SERPL-MCNC: 1.53 MG/DL (ref 0.6–1.3)
CREAT SERPL-MCNC: 1.62 MG/DL (ref 0.6–1.3)
ERYTHROCYTE [DISTWIDTH] IN BLOOD BY AUTOMATED COUNT: 12.8 % (ref 11.6–15.1)
GFR SERPL CREATININE-BSD FRML MDRD: 33 ML/MIN/1.73SQ M
GFR SERPL CREATININE-BSD FRML MDRD: 36 ML/MIN/1.73SQ M
GFR SERPL CREATININE-BSD FRML MDRD: 42 ML/MIN/1.73SQ M
GFR SERPL CREATININE-BSD FRML MDRD: 44 ML/MIN/1.73SQ M
GFR SERPL CREATININE-BSD FRML MDRD: 47 ML/MIN/1.73SQ M
GLUCOSE P FAST SERPL-MCNC: 108 MG/DL (ref 65–99)
GLUCOSE SERPL-MCNC: 108 MG/DL (ref 65–140)
GLUCOSE SERPL-MCNC: 112 MG/DL (ref 65–140)
GLUCOSE SERPL-MCNC: 86 MG/DL (ref 65–140)
GLUCOSE SERPL-MCNC: 88 MG/DL (ref 65–140)
GLUCOSE SERPL-MCNC: 97 MG/DL (ref 65–140)
HCT VFR BLD AUTO: 36.3 % (ref 34.8–46.1)
HGB BLD-MCNC: 12.1 G/DL (ref 11.5–15.4)
MCH RBC QN AUTO: 28.5 PG (ref 26.8–34.3)
MCHC RBC AUTO-ENTMCNC: 33.3 G/DL (ref 31.4–37.4)
MCV RBC AUTO: 86 FL (ref 82–98)
PLATELET # BLD AUTO: 367 THOUSANDS/UL (ref 149–390)
PMV BLD AUTO: 8.6 FL (ref 8.9–12.7)
POTASSIUM SERPL-SCNC: 3.8 MMOL/L (ref 3.5–5.3)
POTASSIUM SERPL-SCNC: 3.9 MMOL/L (ref 3.5–5.3)
POTASSIUM SERPL-SCNC: 4.1 MMOL/L (ref 3.5–5.3)
POTASSIUM SERPL-SCNC: 4.2 MMOL/L (ref 3.5–5.3)
POTASSIUM SERPL-SCNC: 4.5 MMOL/L (ref 3.5–5.3)
RBC # BLD AUTO: 4.24 MILLION/UL (ref 3.81–5.12)
SODIUM SERPL-SCNC: 123 MMOL/L (ref 135–147)
SODIUM SERPL-SCNC: 123 MMOL/L (ref 135–147)
SODIUM SERPL-SCNC: 126 MMOL/L (ref 135–147)
SODIUM SERPL-SCNC: 127 MMOL/L (ref 135–147)
SODIUM SERPL-SCNC: 131 MMOL/L (ref 135–147)
WBC # BLD AUTO: 7 THOUSAND/UL (ref 4.31–10.16)

## 2023-01-19 RX ORDER — AMLODIPINE BESYLATE 10 MG/1
10 TABLET ORAL DAILY
Status: DISCONTINUED | OUTPATIENT
Start: 2023-01-20 | End: 2023-01-19

## 2023-01-19 RX ORDER — AMLODIPINE BESYLATE 10 MG/1
10 TABLET ORAL DAILY
Status: DISCONTINUED | OUTPATIENT
Start: 2023-01-19 | End: 2023-01-22 | Stop reason: HOSPADM

## 2023-01-19 RX ORDER — DESMOPRESSIN ACETATE 4 UG/ML
2 INJECTION, SOLUTION INTRAVENOUS; SUBCUTANEOUS ONCE
Status: COMPLETED | OUTPATIENT
Start: 2023-01-19 | End: 2023-01-19

## 2023-01-19 RX ORDER — SODIUM CHLORIDE 1000 MG
2 TABLET, SOLUBLE MISCELLANEOUS
Status: DISCONTINUED | OUTPATIENT
Start: 2023-01-19 | End: 2023-01-20

## 2023-01-19 RX ADMIN — SODIUM CHLORIDE 75 ML/HR: 0.9 INJECTION, SOLUTION INTRAVENOUS at 07:35

## 2023-01-19 RX ADMIN — OLANZAPINE 7.5 MG: 2.5 TABLET, FILM COATED ORAL at 21:28

## 2023-01-19 RX ADMIN — Medication 2 G: at 18:24

## 2023-01-19 RX ADMIN — DOCUSATE SODIUM 100 MG: 100 CAPSULE, LIQUID FILLED ORAL at 17:39

## 2023-01-19 RX ADMIN — BENZTROPINE MESYLATE 0.5 MG: 1 TABLET ORAL at 22:57

## 2023-01-19 RX ADMIN — Medication 2 G: at 22:57

## 2023-01-19 RX ADMIN — ENOXAPARIN SODIUM 40 MG: 40 INJECTION SUBCUTANEOUS at 21:28

## 2023-01-19 RX ADMIN — ENOXAPARIN SODIUM 40 MG: 40 INJECTION SUBCUTANEOUS at 09:26

## 2023-01-19 RX ADMIN — DOCUSATE SODIUM 100 MG: 100 CAPSULE, LIQUID FILLED ORAL at 09:25

## 2023-01-19 RX ADMIN — ALPRAZOLAM 1 MG: 0.5 TABLET ORAL at 17:43

## 2023-01-19 RX ADMIN — FLUOXETINE 20 MG: 20 CAPSULE ORAL at 09:26

## 2023-01-19 RX ADMIN — AMLODIPINE BESYLATE 10 MG: 10 TABLET ORAL at 09:49

## 2023-01-19 RX ADMIN — DESMOPRESSIN ACETATE 2 MCG: 4 SOLUTION INTRAVENOUS at 10:00

## 2023-01-19 RX ADMIN — METOPROLOL SUCCINATE 100 MG: 100 TABLET, EXTENDED RELEASE ORAL at 09:26

## 2023-01-19 RX ADMIN — PRAVASTATIN SODIUM 40 MG: 40 TABLET ORAL at 17:39

## 2023-01-19 RX ADMIN — DEXTROSE 500 ML: 5 SOLUTION INTRAVENOUS at 09:25

## 2023-01-19 RX ADMIN — BENZTROPINE MESYLATE 0.5 MG: 1 TABLET ORAL at 02:17

## 2023-01-19 NOTE — CONSULTS
Consultation - Nephrology   Sussy Davila 58 y o  female MRN: [id number]  Unit/Bed#: -01 Encounter: [id number]    Inpatient consult to Nephrology  Consult performed by: Ever Matson MD  Consult ordered by: Azeem Michael MD          History of Present Illness   Physician Requesting Consult: Azeem Michael*  Reason for Consult / Principal Problem: Hyponatremia/CKD    HPI: Jerome Mercado is a 58y o  year old female with a PMH of hypertension/anxiety-depression/dyslipidemia/CKD 3 who presents with hyponatremia reported on labs although she was asymptomatic  Outpatient lab work demonstrated a sodium of 123 asked come to the emergency room in the emergency room was 122  The patient was started on isotonic saline  We are asked to see the patient for hyponatremia and CKD    She denies any acute symptoms no weakness at this time, no fevers chills cough or colds    She apparently is eating well however she does report a 10 pound weight loss very recently unintentional  No shortness of breath or leg swelling or chest pain  No nausea vomiting diarrhea  She drinks possibly 2 gallons of water a day because of thirst!  She denies any NSAID use  She has had intermittent mild hyponatremia    She is followed by Dr Wai Sharma from [facility] for CKD: Baseline creatinine is 1 4-1 7  She currently denies any dysuria hematuria voiding symptoms foamy urine        History obtained from the patient, the chart and the old records which I completely reviewed        Review of systems:  General: No fevers chills cough or cold she states her appetite is good but has lost 10 pounds as outlined  Cardiovascular: No chest pain shortness of breath or leg swelling  Respiratory: No wheezing, mild intermittent chronic cough minimal production but this is usual for her  Gastrointestinal: No nausea vomiting diarrhea abdominal pain  Genitourinary: See HPI  Neuro: No headaches  All other systems were reviewed and are negative    Historical Information   Past Medical History:   Diagnosis Date   • Depression    • High cholesterol    • Hypertension    • Psychiatric disorder    • Renal disorder      Past Surgical History:   Procedure Laterality Date   • HYSTERECTOMY       Social History   Social History     Substance and Sexual Activity   Alcohol Use Never     Social History     Substance and Sexual Activity   Drug Use Never     Social History     Tobacco Use   Smoking Status Former   • Packs/day: 3 00   • Types: Cigarettes   • Quit date:    • Years since quittin 0   Smokeless Tobacco Never     History reviewed  No pertinent family history      Meds/Allergies   all current active meds have been reviewed, current meds:   Current Facility-Administered Medications   Medication Dose Route Frequency   • acetaminophen (TYLENOL) tablet 650 mg  650 mg Oral Q6H PRN   • ALPRAZolam (XANAX) tablet 1 mg  1 mg Oral TID PRN   • amLODIPine (NORVASC) tablet 10 mg  10 mg Oral Daily   • benztropine (COGENTIN) tablet 0 5 mg  0 5 mg Oral BID PRN   • docusate sodium (COLACE) capsule 100 mg  100 mg Oral BID   • enoxaparin (LOVENOX) subcutaneous injection 40 mg  40 mg Subcutaneous Q12H NOEL   • FLUoxetine (PROzac) capsule 20 mg  20 mg Oral Daily   • metoprolol succinate (TOPROL-XL) 24 hr tablet 100 mg  100 mg Oral Daily   • OLANZapine (ZyPREXA) tablet 7 5 mg  7 5 mg Oral HS   • ondansetron (ZOFRAN) injection 4 mg  4 mg Intravenous Q6H PRN   • pravastatin (PRAVACHOL) tablet 40 mg  40 mg Oral Daily With Dinner   • sodium chloride 0 9 % infusion  75 mL/hr Intravenous Continuous    and PTA meds:    Medications Prior to Admission   Medication   • ALPRAZolam (XANAX) 1 mg tablet   • cholecalciferol (VITAMIN D3) 1,000 units tablet   • FLUoxetine (PROzac) 40 MG capsule   • metoprolol succinate (TOPROL-XL) 100 mg 24 hr tablet   • OLANZapine (ZyPREXA) 7 5 mg tablet       Allergies   Allergen Reactions   • Benadryl [Diphenhydramine] Shortness Of Breath Objective     Intake/Output Summary (Last 24 hours) at 1/19/2023 0835  Last data filed at 1/19/2023 0735  Gross per 24 hour   Intake 1700 ml   Output 2640 ml   Net -940 ml     Patient Vitals for the past 24 hrs:   BP Temp Temp src Pulse Resp SpO2 Height Weight   01/19/23 0756 136/69 97 7 °F (36 5 °C) Oral 70 20 95 % -- --   01/19/23 0331 130/74 98 8 °F (37 1 °C) Tympanic 65 16 95 % -- --   01/18/23 2353 126/66 98 2 °F (36 8 °C) Oral 60 16 95 % -- --   01/18/23 1954 131/69 (!) 97 1 °F (36 2 °C) Tympanic 66 16 95 % -- --   01/18/23 1642 159/73 (!) 97 1 °F (36 2 °C) Tympanic 64 18 96 % 5' 4" (1 626 m) 115 kg (254 lb 0 2 oz)   01/18/23 1515 152/84 -- -- 68 18 96 % -- --   01/18/23 1410 157/58 97 5 °F (36 4 °C) Oral 65 20 96 % 5' 4" (1 626 m) 122 kg (270 lb)       Invasive Devices:      Vitals:    01/19/23 0756   BP: 136/69   Pulse: 70   Resp: 20   Temp: 97 7 °F (36 5 °C)   SpO2: 95%     General: Well-developed obese female no acute distress  Skin: No acute rash  Eyes: No scleral icterus and noninjected  ENT: No cephalic/atraumatic, mucous membranes moist  Neck: Supple, no jugular sustention, carotid bruits, 1+ crypto, no thyromegaly and trachea midline  Back: No CVA tenderness  Chest: Clear to auscultation and percussion, no use of respiratory accessory muscles good respiratory effort  CVS: Regular rate and rhythm without a murmur rub or gallops appreciable  Abdomen: Obese, soft and nontender normal bowel sounds, hepatosplenomegaly or bruits appreciable  Extremities: No clubbing, no cyanosis, no edema, 1+ dorsalis pedis pulses, no femoral bruits, no significant arthritic changes  Neuro: No gross focality  Psych: Alert oriented and appropriate    Current Weight: Weight - Scale: 115 kg (254 lb 0 2 oz)  First Weight: Weight - Scale: 122 kg (270 lb)    Lab Results:  I have personally reviewed pertinent labs    CBC:   Lab Results   Component Value Date    WBC 7 00 01/19/2023    RBC 4 24 01/19/2023     CMP:   Lab Results Component Value Date    CL 97 01/19/2023    CO2 23 01/19/2023    BUN 15 01/19/2023    CREATININE 1 53 (H) 01/19/2023    CALCIUM 9 5 01/19/2023    AST 13 01/18/2023    ALT 11 01/18/2023    ALKPHOS 83 01/18/2023    EGFR 36 01/19/2023     Phosphorus:   Lab Results   Component Value Date    PHOS 3 6 01/18/2023     Magnesium:   Lab Results   Component Value Date    MG 1 6 (L) 01/18/2023     Urinalysis:   Lab Results   Component Value Date    COLORU Yellow 01/18/2023    CLARITYU Clear 01/18/2023    SPECGRAV <=1 005 01/18/2023    PHUR 6 0 01/18/2023    LEUKOCYTESUR Negative 01/18/2023    NITRITE Negative 01/18/2023    GLUCOSEU Negative 01/18/2023    KETONESU Negative 01/18/2023    BILIRUBINUR Negative 01/18/2023    BLOODU 2+ (A) 01/18/2023     BMP:   Lab Results   Component Value Date    SODIUM 131 (L) 01/19/2023    CO2 23 01/19/2023    BUN 15 01/19/2023    CREATININE 1 53 (H) 01/19/2023    CALCIUM 9 5 01/19/2023     ABGs: No results found for: [facility]  Radiology review:     Chest x-ray from 1/18/2023: No active disease        Assessment/Plan   58y o  year old female with a PMH of hypertension/anxiety-depression/dyslipidemia/CKD 3 who presents with hyponatremia reported on labs although she was asymptomatic  Outpatient lab work demonstrated a sodium of 123 asked come to the emergency room in the emergency room was 122  The patient was started on isotonic saline  We are asked to see the patient for hyponatremia and CKD    1  Hyponatremia: There is some mild degree of chronicity  This is acutely worse at this time  Patient did improve with isotonic saline part of this may have been mildly prerenal and her renal function also has improved suggesting the same  However, certainly polydipsia is playing a large component  And finally given the unintentional weight loss I would perform a CT of the chest abdomen and pelvis to make sure no other cause for possible SIADH as a diagnosis of exclusion    Also patient may have SIADH related to her psychiatric condition with anxiety-depression  Recommend:  Work-up:  - Serum osmolality 262, however true hypotonic hyponatremia  - Uric acid 4 5  - Urine osmolality 186 slightly above maximally dilute urine   -Urine sodium 9 compatible with prerenal  - Thyroid profile normal  -Awaiting a m  cortisol  - CT of the chest abdomen pelvis given weight loss    Treatment:  - I would change her D5 and DDAVP given the fact that she is essentially over correcting with 8 point increase in 9 hours  -Rate of correction should be less than 8 mEq per 24 hours x 2 days to avoid osmotic demyelinating syndrome  - We will follow serum sodium closely    Ultimately treatment  - Fluid restriction for now liberalize however ultimately 2 L at most she drinks apparently 2 gallons  - Potential use of torsemide in the future if needed for hypertension    Acceptable sodium levels:  - 131 x 8 p m  this evening  - 139 x 8 p m  the following evening 1/20    2  CKD 3B: Followed by Dr Britton Olguin:  - Most likely hypertensive nephrosclerosis/arteriolar nephrosclerosis as an etiology  - Baseline creatinine 1 4-1 7  - Current creatinine 1 53 acceptable    3  Hypertension: Separable  Currently on amlodipine and Toprol-XL  At some point I would consider potentially adding a very small dose of a loop diuretic as outlined possibly torsemide but given the fact that she has mild increase in her creatinine on admission I would hold off for now    4  Other problems: Anxiety-depression/morbid obesity/dyslipidemia    Discussed with primary service in particular regarding the recorrection of the sodium with a consequence of giving D5 and DDAVP      Portions of the record may have been created with voice recognition software  Occasional wrong word or "sound a like" substitutions may have occurred due to the inherent limitations of voice recognition software    Read the chart carefully and recognize, using context, where substitutions have occurred

## 2023-01-19 NOTE — UTILIZATION REVIEW
Initial Clinical Review    Admission: Date/Time/Statement: 1/18/23 AT 1515 OBSERVATION - CONVERTED TO INPATIENT 1/20/22 AT 1250 2ND PERSISTENT HYPONATREMIA AFTER DDAVP + IVF NSS, HISTORY CKD STAGE 3  - REQUIRES CONTINUED INPATIENT MANAGEMENT AFTER OBSERVATION > 45 HOURS WITH ELECTROLYTE MONITORING, FLUID RESTRICTION, START HYPERTONIC SALINE      01/20/23 1250  Inpatient Admission  Once        Transfer Service: Hospitalist       Question Answer Comment   Level of Care Med Surg    Estimated length of stay More than 2 Midnights    Certification I certify that inpatient services are medically necessary for this patient for a duration of greater than two midnights  See H&P and MD Progress Notes for additional information about the patient's course of treatment  01/20/23 1250   01/18/23 1515  Place in Observation  Once        Transfer Service: Hospitalist       Question: Level of Care Answer: Med Surg    01/18/23 1515     ED Arrival Information     Expected   -    Arrival   1/18/2023 13:55    Acuity   Urgent            Means of arrival   Walk-In    Escorted by   Family Member    Service   Hospitalist    Admission type   Emergency            Arrival complaint   low sodium levels        Chief Complaint   Patient presents with   • Abnormal Lab     Pt presents to ed via walk in with complaint of having out pt blood work done and her sodium level being low      Initial Presentation:  59 y/o female with PMHx HTN, HLD,CKD Stage 3,  Anxiety, Depression - presents to [facility] ED per MD on 1/18/23 2nd low sodium per outpatient labs  Reports weakness, muscle cramps +thirst - drinking possibly 2 gallons of water daily for many years though instructed by her Nephrologist to cut down on the fluids  Outpatient labs done day prior showed sodium of 123 aIn ED - / 58   ROS/ Exam: muscle cramps + fatigue  Chest x ray with no acute disease  Labs: CBC wnl   Cl 89  K+ 4 2   BUN/ CR 18/1 76 ED Tx: IVF NSS started  Placed in Observation 1/18/23 at 1515 2nd Hyponatremia - IVF continued, Fluid Restriction 1200 ml/day, Renal Consult     1/19/23 RENAL:  1  Hyponatremia: There is some mild degree of chronicity  This is acutely worse at this time  Patient did improve with isotonic saline part of this may have been mildly prerenal and her renal function also has improved suggesting the same  However, certainly polydipsia is playing a large component  And finally given the unintentional weight loss I would perform a CT of the chest abdomen and pelvis to make sure no other cause for possible SIADH as a diagnosis of exclusion  Also patient may have SIADH related to her psychiatric condition with anxiety-depression      Recommend:  - Serum osmolality 262, however true hypotonic hyponatremia  - Uric acid 4 5  - Urine osmolality 186 slightly above maximally dilute urine   -Urine sodium 9 compatible with prerenal  - Thyroid profile normal  -Awaiting a m  cortisol  - CT of the chest abdomen pelvis given weight loss     Treatment:  - Recommend change to  D5 and DDAVP given the fact that she is essentially over correcting with 8 point increase in 9 hours  -Rate of correction should be less than 8 mEq per 24 hours x 2 days to avoid osmotic demyelinating syndrome  - We will follow serum sodium closely     Ultimately treatment  - Fluid restriction for now liberalize however ultimately 2 L at most she drinks apparently 2 gallons  - Potential use of torsemide in the future if needed for hypertension     CONVERTED TO INPATIENT 1/20/22 AT 1250 2ND PERSISTENT HYPONATREMIA AFTER DDAVP + IVF NSS, HISTORY CKD STAGE 3  - REQUIRES CONTINUED INPATIENT MANAGEMENT AFTER OBSERVATION > 45 HOURS WITH ELECTROLYTE MONITORING, FLUID RESTRICTION, START HYPERTONIC SALINE      1/20/23 RENAL:  1  Hyponatremia  multiple things that could be playing a role  First off the urine studies are not consistent with SIADH    Urine studies could suggest hypovolemia but in that situation urine osmolality should be higher than the serum osmolality  Polydipsia is playing a role here as she may be drinking more water than her kidneys can excrete but the urine is not maximally dilute which indicates the presence of ADH    Potentially the patient's mildly volume depleted shown by the urine sodium concentration being low  This may limit her urine volume  And if she is drinking excessive water it may balance towards water retention and hyponatremia  She was given isotonic fluids and sodium concentration improved from 122 mEq/L to 131 mEq/L  The patient was given DDAVP and a small water bolus and sodium concentration dropped and now stable 124 mEq/L    Repeat labs sent around now we will assess where they are at  Fluid restriction, reduce sodium chloride to 1 g twice daily for now  May reimplement isotonic fluids and monitor based on results      10:44 AM ADDENDUM: sodium concentration dropped to 120 mEq/L  WILL START hypertonic saline at 60 cc/h repeat BMP this afternoon and will follow  Given that urine osmolality was low this should be excreted in a higher volume and the improvement of sodium concentration      ED Triage Vitals [01/18/23 1410]   Temperature Pulse Respirations Blood Pressure SpO2   97 5 °F (36 4 °C) 65 20 157/58 96 %      Temp Source Heart Rate Source Patient Position - Orthostatic VS BP Location FiO2 (%)   Oral Monitor Sitting Left arm --      Wt Readings from Last 1 Encounters:   01/18/23 115 kg (254 lb 0 2 oz)     Additional Vital Signs:   Date/Time Temp Pulse Resp BP MAP (mmHg) SpO2 O2 Device Patient Position - Orthostatic VS   01/20/23 1138 98 4 °F (36 9 °C) 62 16 125/76 -- 99 % -- Lying   01/20/23 0745 98 8 °F (37 1 °C) 59 20 130/70 -- 100 % -- Lying   01/20/23 0406 97 9 °F (36 6 °C) 71 18 155/80 109 97 % None (Room air) Lying   01/19/23 2326 98 4 °F (36 9 °C) 67 18 124/53 98 96 % None (Room air) Lying   01/19/23 1902 98 1 °F (36 7 °C) 68 18 119/66 -- 96 % None (Room air) Lying   01/19/23 1536 98 1 °F (36 7 °C) 64 18 129/73 -- 97 % None (Room air) Lying   01/19/23 1146 97 7 °F (36 5 °C) 70 20 145/77 -- 96 % -- Lying   01/19/23 0756 97 7 °F (36 5 °C) 70 20 136/69 -- 95 % -- Lying   01/19/23 0331 98 8 °F (37 1 °C) 65 16 130/74 96 95 % None (Room air) Lying   01/18/23 2353 98 2 °F (36 8 °C) 60 16 126/66 89 95 % None (Room air) Lying   01/18/23 1954 97 1 °F (36 2 °C) Abnormal  66 16 131/69 -- 95 % -- Lying   01/18/23 1833 -- -- -- -- -- -- None (Room air) --   01/18/23 1642 97 1 °F (36 2 °C) Abnormal  64 18 159/73 -- 96 % -- Lying   01/18/23 1515 -- 68 18 152/84 -- 96 % None (Room air) --   01/18/23 1410 97 5 °F (36 4 °C) 65 20 157/58 -- 96 % None (Room air) Sitting      01/18 0701   01/19 0700 01/19 0701   01/20 0700 01/20 0701   -   P  O  720 600 360   I V  (mL/kg)  980 (8 5)    Total Intake(mL/kg) 720 (6 3) 1580 (13 7) 360 (3 1)   Urine (mL/kg/hr) 2640 1651 (0 6)    Total Output 2640 1651    Net -1920 -71 +360         Unmeasured Urine Occurrence 1 x 1 x      Labs/ Diagnostic Results:   XR chest 1 view portable   No acute cardiopulmonary disease       Results from last 7 days   Lab Units 01/20/23  0427 01/19/23  0530 01/18/23  1942 01/18/23  1413   WBC Thousand/uL 7 40 7 00  --  9 53   HEMOGLOBIN g/dL 11 5 12 1  --  13 0   HEMATOCRIT % 34 0* 36 3  --  37 6   PLATELETS Thousands/uL 332 367 341 414*   NEUTROS ABS Thousands/µL 4 47  --   --  7 01     Results from last 7 days   Lab Units 01/20/23  0844 01/20/23  0427 01/20/23  0157 01/19/23  2225 01/19/23  1739 01/18/23  1942 01/18/23  1413   SODIUM mmol/L 120* 124* 124* 123* 123*   < > 122*   POTASSIUM mmol/L 4 6 4 0 3 8 3 9 4 2   < > 4 2   CHLORIDE mmol/L 89* 91* 93* 92* 92*   < > 89*   CO2 mmol/L 23 23 23 22 23   < > 21   ANION GAP mmol/L 8 10 8 9 8   < > 12   BUN mg/dL 9 10 12 14 15   < > 18   CREATININE mg/dL 1 14 1 19 1 17 1 23 1 30   < > 1 76*   EGFR ml/min/1 73sq m 51 49 50 47 44   < > 30   CALCIUM mg/dL 8 9 9 0 8 5 8 5 8 7   < > 9 9   MAGNESIUM mg/dL  --  1 5*  --   --   --   --  1 6*   PHOSPHORUS mg/dL  --   --   --   --   --   --  3 6    < > = values in this interval not displayed       Results from last 7 days   Lab Units 01/18/23  1413   AST U/L 13   ALT U/L 11   ALK PHOS U/L 83   TOTAL PROTEIN g/dL 7 4   ALBUMIN g/dL 4 2   TOTAL BILIRUBIN mg/dL 0 43       Results from last 7 days   Lab Units 01/20/23  0844 01/20/23  0427 01/20/23  0157 01/19/23  2225 01/19/23  1739 01/19/23  1220 01/19/23  0530 01/19/23  0018 01/18/23  1942 01/18/23  1413   GLUCOSE RANDOM mg/dL 136 85 85 88 112 86 108 97 122 114     Results from last 7 days   Lab Units 01/18/23  1413   OSMOLALITY, SERUM mmol/*     Results from last 7 days   Lab Units 01/18/23  1413   HS TNI 0HR ng/L 4     Results from last 7 days   Lab Units 01/18/23  1413   TSH 3RD GENERATON uIU/mL 1 989     Results from last 7 days   Lab Units 01/18/23  1444 01/18/23  1413   OSMOLALITY, SERUM mmol/KG  --  262*   OSMO UR mmol/*  --      Results from last 7 days   Lab Units 01/18/23  1444   CLARITY UA  Clear   COLOR UA  Yellow   SPEC GRAV UA  <=1 005   PH UA  6 0   GLUCOSE UA mg/dl Negative   KETONES UA mg/dl Negative   BLOOD UA  2+*   PROTEIN UA mg/dl Negative   NITRITE UA  Negative   BILIRUBIN UA  Negative   UROBILINOGEN UA E U /dl 0 2   LEUKOCYTES UA  Negative   WBC UA /hpf 0-1   RBC UA /hpf 0-1   BACTERIA UA /hpf Occasional   EPITHELIAL CELLS WET PREP /hpf Occasional   SODIUM UR  9     ED Treatment:   Medication Administration from 01/18/2023 1355 to 01/18/2023 1613       Date/Time Order Dose Route Action     01/18/2023 1511 EST sodium chloride 0 9 % infusion 75 mL/hr Intravenous New Bag     Past Medical History:   Diagnosis Date   • Depression    • High cholesterol    • Hypertension    • Psychiatric disorder    • Renal disorder      Present on Admission:  • Arthritis  • Hypercholesterolemia  • Stage 3a chronic kidney disease (HCC)  • Benign essential hypertension    Admitting Diagnosis: Hypomagnesemia [E83 42]  Hyponatremia [E87 1]  Abnormal laboratory test [R89 9]    Age/Sex: 58 y o  female    Admission Orders:  Telemetry  VS q4hrs  Continuous Pulse Oximetry  SCD  Up + OOB as tolerated  Diet Cardiovascular; Cardiac; Fluid Restriction 1200 ML   I+O q shift  Daily weight  Serial BMP q4hrs    Scheduled Medications:  amLODIPine, 10 mg, Oral, Daily  IV dextrose, 500 mL, Intravenous, Once  docusate sodium, 100 mg, Oral, BID  enoxaparin, 40 mg, Subcutaneous, Q12H NOEL  FLUoxetine, 20 mg, Oral, Daily  metoprolol succinate, 100 mg, Oral, Daily  OLANZapine, 7 5 mg, Oral, HS  pravastatin, 40 mg, Oral, Daily With Dinner    Continuous IV Infusions:  IVF sodium chloride 0 9 % infusion, Intravenous, Continuous at 75 mL/ hr  - END 1/20/23     IVF sodium chloride 23 4% (HYPERTONIC) 308 mEq in sterile water 1,000 mL infusion   Ordered Dose: -- Route: Intravenous Frequency: Continuous @ 60 mL/hr   Scheduled Start Date/Time: 01/20/23 1100 End Date/Time: --      PRN Meds:  acetaminophen, 650 mg, Oral, Q6H PRN  ALPRAZolam, 1 mg, Oral, TID PRN - 1/18 X 1, 1/19 X 1  benztropine, 0 5 mg, Oral, BID PRN - 1/19 X 2  IV ondansetron, 4 mg, Intravenous, Q6H PRN - 1/20 X 1    IP CONSULT TO NEPHROLOGY  IP CONSULT TO PSYCHIATRY    Network Utilization Review Department  ATTENTION: Please call with any questions or concerns to [phone] and carefully listen to the prompts so that you are directed to the right person  All voicemails are confidential   Jose Ramon Alvarez all requests for admission clinical reviews, approved or denied determinations and any other requests to dedicated fax number below belonging to the campus where the patient is receiving treatment   List of dedicated fax numbers for the Facilities:  [address] DENIALS (Administrative/Medical Necessity) [phone]   [address] [place] [address] (Maternity/NICU/Pediatrics) [phone]   [address] [place] [address] [address] [place] [phone]   [place] [address] [phone]   [facility] [address] [place] [address] [facility] [address] [place] [address] 310 [facility] 134 565 [facility] [phone]

## 2023-01-19 NOTE — PLAN OF CARE
Problem: PAIN - ADULT  Goal: Verbalizes/displays adequate comfort level or baseline comfort level  Description: Interventions:  - Encourage patient to monitor pain and request assistance  - Assess pain using appropriate pain scale  - Administer analgesics based on type and severity of pain and evaluate response  - Implement non-pharmacological measures as appropriate and evaluate response  - Consider cultural and social influences on pain and pain management  - Notify physician/advanced practitioner if interventions unsuccessful or patient reports new pain  Outcome: Progressing     Problem: INFECTION - ADULT  Goal: Absence or prevention of progression during hospitalization  Description: INTERVENTIONS:  - Assess and monitor for signs and symptoms of infection  - Monitor lab/diagnostic results  - Monitor all insertion sites, i e  indwelling lines, tubes, and drains  - Monitor endotracheal if appropriate and nasal secretions for changes in amount and color  - [place] appropriate cooling/warming therapies per order  - Administer medications as ordered  - Instruct and encourage patient and family to use good hand hygiene technique  - Identify and instruct in appropriate isolation precautions for identified infection/condition  Outcome: Progressing     Problem: SAFETY ADULT  Goal: Patient will remain free of falls  Description: INTERVENTIONS:  - Educate patient/family on patient safety including physical limitations  - Instruct patient to call for assistance with activity   - Consult OT/PT to assist with strengthening/mobility   - Keep Call bell within reach  - Keep bed low and locked with side rails adjusted as appropriate  - Keep care items and personal belongings within reach  - Initiate and maintain comfort rounds  - Make Fall Risk Sign visible to staff  - Offer Toileting every 2  Hours, in advance of need  - Apply yellow socks and bracelet for high fall risk patients  - Consider moving patient to room near nurses station  Outcome: Progressing

## 2023-01-19 NOTE — CASE MANAGEMENT
Case Management Assessment    Patient name Bridget Denny  Location /-93 MRN [id number]  : 1960 Date 2023       Current Admission Date: 2023  Current Admission Diagnosis:Hyponatremia   Patient Active Problem List    Diagnosis Date Noted   • Hyponatremia 2023   • Class 3 severe obesity due to excess calories without serious comorbidity with body mass index (BMI) of 40 0 to 44 9 in adult [facility]) 2022   • Hypercholesterolemia 2022   • Arthritis 10/13/2020   • Benign essential hypertension 10/13/2020   • Schizophrenia ([facility] [address] ) 10/13/2020   • Stage 3a chronic kidney disease ([facility] [address] ) 10/13/2020      LOS (days): 0  Geometric Mean LOS (GMLOS) (days):   Days to GMLOS:     OBJECTIVE:              Current admission status: Observation       Preferred Pharmacy:   [facility] [address] [id number] [address]  Phone: [phone] Fax: [phone]    Primary Care Provider: Paulina Hogan MD    Primary Insurance: [employer] [facility]  Secondary Insurance:     ASSESSMENT:  Daniel Medel Proxies    There are no active Health Care Proxies on file  Readmission Root Cause  30 Day Readmission: No    Patient Information  Admitted from[de-identified] Home  Mental Status: Alert  During Assessment patient was accompanied by: Other-Comment (ICM worker from [employer])  Assessment information provided by[de-identified] Patient  Primary Caregiver: Self  Support Systems: Self, Family members, /  Home entry access options   Select all that apply : No steps to enter home  Type of Current Residence: Apartment  Floor Level: 4 (with elevator in building )  Upon entering residence, is there a bedroom on the main floor (no further steps)?: Yes  Upon entering residence, is there a bathroom on the main floor (no further steps)?: Yes  In the last 12 months, was there a time when you were not able to pay the mortgage or rent on time?: No  In the last 12 months, was there a time when you did not have a steady place to sleep or slept in a shelter (including now)?: No  Homeless/housing insecurity resource given?: N/A  Living Arrangements: Lives Alone    Activities of Daily Living Prior to Admission  Functional Status: Independent  Completes ADLs independently?: Yes  Ambulates independently?: Yes  Does patient use assisted devices?: Yes  Assisted Devices (DME) used: Rollator  Does patient currently own DME?: Yes  What DME does the patient currently own?: Rollator  Does patient have a history of Outpatient Therapy (PT/OT)?: Yes  Does the patient have a history of Short-Term Rehab?: No  Does patient have a history of HHC?: No  Does patient currently have AQUA PURE?: No         Patient Information Continued  Income Source: SSI/SSD  Does patient have prescription coverage?: Yes  Within the past 12 months, you worried that your food would run out before you got the money to buy more : Never true  Within the past 12 months, the food you bought just didn't last and you didn't have money to get more : Never true  Food insecurity resource given?: N/A  Does patient receive dialysis treatments?: No  Does patient have a history of substance abuse?: No  Does patient have a history of Mental Health Diagnosis?: Yes (Schizophrenic disorder)  Is patient receiving treatment for mental health?: Yes  Has patient received inpatient treatment related to mental health in the last 2 years?: No         Means of Transportation  Means of Transport to Appts[de-identified] Family transport  In the past 12 months, has lack of transportation kept you from medical appointments or from getting medications?: No  In the past 12 months, has lack of transportation kept you from meetings, work, or from getting things needed for daily living?: No  Was application for public transport provided?: N/A    At this time, no anticipated needs for dc at this time   Cm will follow for any changes in dc recommendations

## 2023-01-19 NOTE — TELEMEDICINE
TeleConsultation - [address] Rox[address] Davila 58 y o  female MRN: [id number]  Unit/Bed#: -01 Encounter: [id number]        REQUIRED DOCUMENTATION:     1  This service was provided via Telemedicine  2  Provider located at [place]  3  TeleMed provider: Dannie Farrell MD   4  Identify all parties in room with patient during tele consult:  pt  5  Patient was then informed that this was a Telemedicine visit and that the exam was being conducted confidentially over secure lines  My office door was closed  No one else was in the room  Patient acknowledged consent and understanding of privacy and security of the Telemedicine visit, and gave us permission to have the assistant stay in the room in order to assist with the history and to conduct the exam   I informed the patient that I have reviewed their record in Epic and presented the opportunity for them to ask any questions regarding the visit today  The patient agreed to participate  Assessment/Plan     Principal Problem:    Hyponatremia  Active Problems:    Arthritis    Benign essential hypertension    Class 3 severe obesity due to excess calories without serious comorbidity with body mass index (BMI) of 40 0 to 44 9 in [facility])    Hypercholesterolemia    Stage 3a chronic kidney disease (HCC)    Assessment:    Schizophrenia by history; unspecified mood disorder with anxiety    Treatment Plan:    As fluoxetine may be a contributing factor to hyponatremia, recommend replacing it with Remeron 30 mg p o  nightly for depression anxiety  Upon discharge the patient should resume her outpatient psychiatric follow-up with her psychiatrist next available appointment  No suicide precautions are indicated at this time  Reconsult psychiatry as needed      Current Medications:     Current Facility-Administered Medications   Medication Dose Route Frequency Provider Last Rate   • acetaminophen  650 mg Oral Q6H PRN Vee Webb MD     • ALPRAZolam  1 mg Oral TID PRN Charu Frausto MD     • amLODIPine  10 mg Oral Daily Jair Mesa MD     • benztropine  0 5 mg Oral BID PRN Charu Frausto MD     • docusate sodium  100 mg Oral BID Charu Frausto MD     • enoxaparin  40 mg Subcutaneous Q12H [facility] HOME Vee Olivas MD     • FLUoxetine  20 mg Oral Daily Vee Olivas MD     • metoprolol succinate  100 mg Oral Daily Vee Olivas MD     • OLANZapine  7 5 mg Oral HS Vee Olivas MD     • ondansetron  4 mg Intravenous Q6H PRN Charu Frausto MD     • pravastatin  40 mg Oral Daily With Dinner Charu Frausto MD         Risks / Benefits of Treatment:    Risks, benefits, and possible side effects of medications explained to patient and patient verbalizes understanding  Inpatient consult to Psychiatry  Consult performed by: Zo Champion MD  Consult ordered by: Charu Frausto MD        [facility] Problem:Hyponatremia    Reason for Consult: Hyponatremia; schizophrenia      History of Present Illness      Patient is a 58 y o  female who presented to the emergency department with a provider document the followin-year-old female comes in for evaluation of abnormal labs  Patient was sent for outpatient labs by nephrologist   Her sodium was reported as 123  Patient states that she was told to come to the emergency department  Patient's only complaint is of muscle cramps but states that this is not new  She denies any headache blurred vision chest pain shortness of breath nausea vomiting or diarrhea  No abdominal pain    Patient states she has had low sodium before but never this low         History provided by:  Patient and medical records   used: No    Medical Problem  Location:  Patient sent in for hyponatremia  Quality:  Low  Severity: Moderate  Onset quality:  Sudden  Timing:  Constant  Progression:  Worsening  Chronicity:  New  Context:  History of chronic kidney disease  Ineffective treatments:  None tried  Associated symptoms: fatigue and myalgias    Associated symptoms: no abdominal pain, no chest pain, no cough, no ear pain, no fever, no rash, no rhinorrhea, no shortness of breath, no sore throat and no vomiting    Associated symptoms comment:  Muscle cramps        None         Medical History[]Expand by Default        Past Medical History:   Diagnosis Date   • Depression     • High cholesterol     • Hypertension     • Psychiatric disorder     • Renal disorder              Surgical History[]Expand by Default   Past Surgical History:   Procedure Laterality Date   • HYSTERECTOMY                Family History[]Expand by Default   History reviewed  No pertinent family history  I have reviewed and agree with the history as documented            E-Cigarette/Vaping   • E-Cigarette Use Never User        E-Cigarette/Vaping Substances      Social History            Tobacco Use   • Smoking status: Former       Packs/day: 3 00       Types: Cigarettes       Quit date: 36       Years since quittin 0   • Smokeless tobacco: Never   Vaping Use   • Vaping Use: Never used   Substance Use Topics   • Alcohol use: Never   • Drug use: Never         Review of Systems   Constitutional: Positive for fatigue  Negative for chills and fever  HENT: Negative for ear pain, rhinorrhea and sore throat  Eyes: Negative for pain and visual disturbance  Respiratory: Negative for cough and shortness of breath  Cardiovascular: Negative for chest pain and palpitations  Gastrointestinal: Negative for abdominal pain and vomiting  Genitourinary: Negative for dysuria and hematuria  Musculoskeletal: Positive for myalgias  Negative for arthralgias and back pain  Skin: Negative for color change and rash  Neurological: Negative for seizures and syncope     All other systems reviewed and are negative  The patient has no psychiatric complaints at this time  Past psychiatric history: The patient states she sees an outpatient psychiatrist currently for schizophrenia with history of depression and anxiety  She states that several days ago, because she had developed some "blinking and cramps in the foot" were Zyprexa was reduced from 10 mg to 7 5 mg daily  She states she has not had opportunity to see if it made any difference  He does admit that time she has exacerbation of depression with passive fleeting suicidal thoughts without plan or intent  She states this last occurred a couple weeks ago  She denies that she is ever had any suicidal plan or intent  She denies any prior suicide attempts  She states Prozac 40 mg daily has been prescribed to her as an outpatient has been very beneficial for her depression and anxiety  She reports benefiting from it without adverse side effects noted  Social history: Patient is single and has no children  She lives alone states she very much enjoys her home  She reports no abuse  Family history: The patient states that maternal grandmother was "put away in a hospital for schizophrenia"  She states her mother had 2 uncles who killed himself  Substance use history: The patient denies use of alcohol and other substances  Mental status examination: The patient was alert and well oriented to person, place, time and situation  She made good eye contact and was very pleasant and cooperative for evaluation  Affect was euthymic congruent with her stated mood  Speech was unremarkable  Sensorium is clear  Thought process was logical and linear  Thought content was reality based  Associations tight  Memory was intact in all spheres  She appears to be of average intelligence (vocabulary, general fund of knowledge, sentence structure and syntax  She denies suicidal homicidal ideation    She denies hallucinations and other psychotic features  No eye blinking was observed but at times she did raise her eyebrows as she spoke  Insight and judgment are intact at this time  She remains motivated to follow-up with her psychiatric outpatient treatment  Past Medical History:   Diagnosis Date   • Depression    • High cholesterol    • Hypertension    • Psychiatric disorder    • Renal disorder        Medical Review Of Systems:    Review of Systems    Meds/Allergies     all current active meds have been reviewed  Allergies   Allergen Reactions   • Benadryl [Diphenhydramine] Shortness Of Breath       Objective     Vital signs in last 24 hours:  Temp:  [97 1 °F (36 2 °C)-98 8 °F (37 1 °C)] 97 7 °F (36 5 °C)  HR:  [60-70] 70  Resp:  [16-20] 20  BP: (126-159)/(66-84) 145/77      Intake/Output Summary (Last 24 hours) at 1/19/2023 1433  Last data filed at 1/19/2023 1143  Gross per 24 hour   Intake 1700 ml   Output 3240 ml   Net -1540 ml           Lab Results: I have personally reviewed all pertinent laboratory/tests results  Imaging Studies: XR chest 1 view portable    Result Date: 1/18/2023  Narrative: CHEST INDICATION:   abd labs  COMPARISON:  None EXAM PERFORMED/VIEWS:  XR CHEST PORTABLE FINDINGS: Cardiomediastinal silhouette appears unremarkable  The lungs are clear  No pneumothorax or pleural effusion  Osseous structures appear within normal limits for patient age  Impression: No acute cardiopulmonary disease  Workstation performed: [id number]     EKG/Pathology/Other Studies:   Lab Results   Component Value Date    VENTRATE 65 01/18/2023    ATRIALRATE 65 01/18/2023    PRINT 138 01/18/2023    QRSDINT 152 01/18/2023    QTINT 450 01/18/2023    QTCINT 468 01/18/2023    PAXIS 40 01/18/2023    QRSAXIS -12 01/18/2023    TWAVEAXIS 32 01/18/2023        Code Status: Level 1 - Full Code  Advance Directive and Living Will:      Power of :    POLST:      Counseling / Coordination of Care:     Total floor / unit time spent today 30 minutes  Greater than 50% of total time was spent with the patient and / or family counseling and / or coordination of care  A description of the counseling / coordination of care: Chart review, patient evaluation, coordination communication with staff, nursing and provider

## 2023-01-20 LAB
ANION GAP SERPL CALCULATED.3IONS-SCNC: 10 MMOL/L (ref 4–13)
ANION GAP SERPL CALCULATED.3IONS-SCNC: 10 MMOL/L (ref 4–13)
ANION GAP SERPL CALCULATED.3IONS-SCNC: 8 MMOL/L (ref 4–13)
ANION GAP SERPL CALCULATED.3IONS-SCNC: 8 MMOL/L (ref 4–13)
ANION GAP SERPL CALCULATED.3IONS-SCNC: 9 MMOL/L (ref 4–13)
BASOPHILS # BLD AUTO: 0.06 THOUSANDS/ÂΜL (ref 0–0.1)
BASOPHILS NFR BLD AUTO: 1 % (ref 0–1)
BUN SERPL-MCNC: 10 MG/DL (ref 5–25)
BUN SERPL-MCNC: 12 MG/DL (ref 5–25)
BUN SERPL-MCNC: 9 MG/DL (ref 5–25)
CALCIUM SERPL-MCNC: 8.5 MG/DL (ref 8.4–10.2)
CALCIUM SERPL-MCNC: 8.9 MG/DL (ref 8.4–10.2)
CALCIUM SERPL-MCNC: 8.9 MG/DL (ref 8.4–10.2)
CALCIUM SERPL-MCNC: 9 MG/DL (ref 8.4–10.2)
CALCIUM SERPL-MCNC: 9.1 MG/DL (ref 8.4–10.2)
CHLORIDE SERPL-SCNC: 89 MMOL/L (ref 96–108)
CHLORIDE SERPL-SCNC: 90 MMOL/L (ref 96–108)
CHLORIDE SERPL-SCNC: 91 MMOL/L (ref 96–108)
CHLORIDE SERPL-SCNC: 93 MMOL/L (ref 96–108)
CHLORIDE SERPL-SCNC: 95 MMOL/L (ref 96–108)
CO2 SERPL-SCNC: 23 MMOL/L (ref 21–32)
CO2 SERPL-SCNC: 24 MMOL/L (ref 21–32)
CORTIS SERPL-MCNC: 30.8 UG/DL
CREAT SERPL-MCNC: 1.1 MG/DL (ref 0.6–1.3)
CREAT SERPL-MCNC: 1.14 MG/DL (ref 0.6–1.3)
CREAT SERPL-MCNC: 1.16 MG/DL (ref 0.6–1.3)
CREAT SERPL-MCNC: 1.17 MG/DL (ref 0.6–1.3)
CREAT SERPL-MCNC: 1.19 MG/DL (ref 0.6–1.3)
EOSINOPHIL # BLD AUTO: 0.2 THOUSAND/ÂΜL (ref 0–0.61)
EOSINOPHIL NFR BLD AUTO: 3 % (ref 0–6)
ERYTHROCYTE [DISTWIDTH] IN BLOOD BY AUTOMATED COUNT: 12.8 % (ref 11.6–15.1)
GFR SERPL CREATININE-BSD FRML MDRD: 49 ML/MIN/1.73SQ M
GFR SERPL CREATININE-BSD FRML MDRD: 50 ML/MIN/1.73SQ M
GFR SERPL CREATININE-BSD FRML MDRD: 50 ML/MIN/1.73SQ M
GFR SERPL CREATININE-BSD FRML MDRD: 51 ML/MIN/1.73SQ M
GFR SERPL CREATININE-BSD FRML MDRD: 53 ML/MIN/1.73SQ M
GLUCOSE P FAST SERPL-MCNC: 85 MG/DL (ref 65–99)
GLUCOSE P FAST SERPL-MCNC: 85 MG/DL (ref 65–99)
GLUCOSE SERPL-MCNC: 104 MG/DL (ref 65–140)
GLUCOSE SERPL-MCNC: 136 MG/DL (ref 65–140)
GLUCOSE SERPL-MCNC: 85 MG/DL (ref 65–140)
GLUCOSE SERPL-MCNC: 85 MG/DL (ref 65–140)
GLUCOSE SERPL-MCNC: 87 MG/DL (ref 65–140)
HCT VFR BLD AUTO: 34 % (ref 34.8–46.1)
HGB BLD-MCNC: 11.5 G/DL (ref 11.5–15.4)
IMM GRANULOCYTES # BLD AUTO: 0.02 THOUSAND/UL (ref 0–0.2)
IMM GRANULOCYTES NFR BLD AUTO: 0 % (ref 0–2)
LYMPHOCYTES # BLD AUTO: 2.01 THOUSANDS/ÂΜL (ref 0.6–4.47)
LYMPHOCYTES NFR BLD AUTO: 27 % (ref 14–44)
MAGNESIUM SERPL-MCNC: 1.5 MG/DL (ref 1.9–2.7)
MCH RBC QN AUTO: 28.5 PG (ref 26.8–34.3)
MCHC RBC AUTO-ENTMCNC: 33.8 G/DL (ref 31.4–37.4)
MCV RBC AUTO: 84 FL (ref 82–98)
MONOCYTES # BLD AUTO: 0.64 THOUSAND/ÂΜL (ref 0.17–1.22)
MONOCYTES NFR BLD AUTO: 9 % (ref 4–12)
NEUTROPHILS # BLD AUTO: 4.47 THOUSANDS/ÂΜL (ref 1.85–7.62)
NEUTS SEG NFR BLD AUTO: 60 % (ref 43–75)
NRBC BLD AUTO-RTO: 0 /100 WBCS
PLATELET # BLD AUTO: 332 THOUSANDS/UL (ref 149–390)
PMV BLD AUTO: 9 FL (ref 8.9–12.7)
POTASSIUM SERPL-SCNC: 3.8 MMOL/L (ref 3.5–5.3)
POTASSIUM SERPL-SCNC: 4 MMOL/L (ref 3.5–5.3)
POTASSIUM SERPL-SCNC: 4.1 MMOL/L (ref 3.5–5.3)
POTASSIUM SERPL-SCNC: 4.1 MMOL/L (ref 3.5–5.3)
POTASSIUM SERPL-SCNC: 4.6 MMOL/L (ref 3.5–5.3)
RBC # BLD AUTO: 4.03 MILLION/UL (ref 3.81–5.12)
SODIUM SERPL-SCNC: 120 MMOL/L (ref 135–147)
SODIUM SERPL-SCNC: 123 MMOL/L (ref 135–147)
SODIUM SERPL-SCNC: 124 MMOL/L (ref 135–147)
SODIUM SERPL-SCNC: 124 MMOL/L (ref 135–147)
SODIUM SERPL-SCNC: 128 MMOL/L (ref 135–147)
WBC # BLD AUTO: 7.4 THOUSAND/UL (ref 4.31–10.16)

## 2023-01-20 RX ORDER — SODIUM CHLORIDE 1000 MG
1 TABLET, SOLUBLE MISCELLANEOUS 2 TIMES DAILY WITH MEALS
Status: DISCONTINUED | OUTPATIENT
Start: 2023-01-20 | End: 2023-01-21

## 2023-01-20 RX ADMIN — Medication 2 G: at 08:37

## 2023-01-20 RX ADMIN — DOCUSATE SODIUM 100 MG: 100 CAPSULE, LIQUID FILLED ORAL at 17:14

## 2023-01-20 RX ADMIN — FLUOXETINE 20 MG: 20 CAPSULE ORAL at 08:36

## 2023-01-20 RX ADMIN — ENOXAPARIN SODIUM 40 MG: 40 INJECTION SUBCUTANEOUS at 08:37

## 2023-01-20 RX ADMIN — ALPRAZOLAM 1 MG: 0.5 TABLET ORAL at 20:55

## 2023-01-20 RX ADMIN — OLANZAPINE 7.5 MG: 2.5 TABLET, FILM COATED ORAL at 21:20

## 2023-01-20 RX ADMIN — ONDANSETRON 4 MG: 2 INJECTION INTRAMUSCULAR; INTRAVENOUS at 04:34

## 2023-01-20 RX ADMIN — Medication 1 G: at 16:48

## 2023-01-20 RX ADMIN — SODIUM CHLORIDE: 4 INJECTION, SOLUTION, CONCENTRATE INTRAVENOUS at 11:53

## 2023-01-20 RX ADMIN — ENOXAPARIN SODIUM 40 MG: 40 INJECTION SUBCUTANEOUS at 20:55

## 2023-01-20 RX ADMIN — METOPROLOL SUCCINATE 100 MG: 100 TABLET, EXTENDED RELEASE ORAL at 08:36

## 2023-01-20 RX ADMIN — DOCUSATE SODIUM 100 MG: 100 CAPSULE, LIQUID FILLED ORAL at 08:36

## 2023-01-20 RX ADMIN — PRAVASTATIN SODIUM 40 MG: 40 TABLET ORAL at 16:48

## 2023-01-20 RX ADMIN — AMLODIPINE BESYLATE 10 MG: 10 TABLET ORAL at 08:36

## 2023-01-20 NOTE — INCIDENTAL FINDINGS
From: Doreen Field  Sent: 6/6/2019 1:29 PM CDT  Subject: E-Visit Submission: Urinary tract infection (UTI)    E-Visit Submission: Urinary tract infection (UTI)  --------------------------------    Question: To help us route your E-Visit to the right provider, where is your current residence?  Answer: [place]    Question: Have you seen a healthcare provider for this complaint within the last 7 days?  Answer: No    Question: Which of the following symptoms are you experiencing: painful urination, difficult urination, change in urine appearance or smell?  Answer: Change in urine appearance or smell    Question: If you have pain when passing urine, which of these apply: burning sensation, pain on the inside, pain on or near the outside, or none of the above?  Answer: The pain feels like it is on the inside    Question: Are you able to pass urine?  Answer: Yes, I can pass urine.    Question: How long have you had pain or difficulty passing urine?  Answer: More than two days but less than one week    Question: Do you have a fever?  Answer: No, I do not have a fever    Question: Do you have any of the following symptoms: back pain, vomiting, diarrhea, shaking chills, or none of the above?   Answer: None of the above    Question: Do you have belly pain with this illness: pressure below the belly button, pain across the lower belly, widespread belly pain, shaking chills, or none of the above?  Answer: None of the above    Question: Do you have a sense of urgency when you need to pass urine?  Answer: Yes, I have urgency    Question: What does your urine look like?  Answer: It is clear    Question: Do you have any of the following: blood, dark red or dark brown urine, an unusual smell, or none of the above?  Answer: An unusual smell    Question: Do you have any unusual discharge from the vagina?  Answer: No    Question: Do you have any sores on your genitals?  Answer: No    Question: Have you had any kidney problems in  The following findings require follow up:  Radiographic finding   Findin 4 cm hypodense lesion in the right ovary    Recommend further evaluation with nonemergent pelvic ultrasound       Follow up required: US pelvis    Follow up should be done within 2-3 week(s)    Please notify the following clinician to assist with the follow up:   Dr Ronak Vera / Krzysztof Rivas the past?  Answer: No    Question: Have you been treated for a urinary/bladder infection in the past?  Answer: Yes    Question: How long ago were you treated?  Answer: More than 4 weeks ago    Question: Within the past 3 months, have you had any surgery on your kidneys or bladder, or have you had a tube inserted to collect your urine?  Answer: No, I have not had either    Question: Have you had similar symptoms in the past?  Answer: Yes, I have had similar symptoms more than once before    Question: If you had similar symptoms in the past, did any of the following work: antibiotics, cranberry juice, pills for yeast, cream for yeast, don't remember, or other?  Answer: Antibiotics   Cranberry juice   Pills for yeast infection    Question: Have you taken antibiotics in the last 30 days?  Answer: I have not been on any antibiotics    Question: Do you have any chronic illnesses such as diabetes, heart disease, lung disease, or any illness that would weaken your ability to fight infection?  Answer: No    Question: Do you take any medications that can suppress the immune system (chemotherapy, steroids, transplant antirejection medications?   Answer: No    Question: Are you pregnant?  Answer: I am confident that I am not pregnant    Question: Are you breast-feeding?  Answer: No    Question: Do you need a work/school excuse letter?  Answer: No    Question: Is there anything else you would like to add?  Answer: I took a home AZO UTI test, which came back positive for Nitrites. I have taken Macrobid in the past which tends to clear up UTI's for me. This is a classic UTI for me.    Question: Thank you for completing this questionnaire. One or more of your responses may indicate that a face-to-face evaluation of these symptoms with a physician is warranted. If that is necessary, what is the preferred telephone number that we can use to contact you?  Answer: [phone]

## 2023-01-20 NOTE — PLAN OF CARE
Problem: METABOLIC, FLUID AND ELECTROLYTES - ADULT  Goal: Electrolytes maintained within normal limits  Description: INTERVENTIONS:  - Monitor labs and assess patient for signs and symptoms of electrolyte imbalances  - Administer electrolyte replacement as ordered  - Monitor response to electrolyte replacements, including repeat lab results as appropriate  - Instruct patient on fluid and nutrition as appropriate  Outcome: Progressing     Problem: PAIN - ADULT  Goal: Verbalizes/displays adequate comfort level or baseline comfort level  Description: Interventions:  - Encourage patient to monitor pain and request assistance  - Assess pain using appropriate pain scale  - Administer analgesics based on type and severity of pain and evaluate response  - Implement non-pharmacological measures as appropriate and evaluate response  - Consider cultural and social influences on pain and pain management  - Notify physician/advanced practitioner if interventions unsuccessful or patient reports new pain  Outcome: Progressing     Problem: INFECTION - ADULT  Goal: Absence or prevention of progression during hospitalization  Description: INTERVENTIONS:  - Assess and monitor for signs and symptoms of infection  - Monitor lab/diagnostic results  - Monitor all insertion sites, i e  indwelling lines, tubes, and drains  - Monitor endotracheal if appropriate and nasal secretions for changes in amount and color  - [place] appropriate cooling/warming therapies per order  - Administer medications as ordered  - Instruct and encourage patient and family to use good hand hygiene technique  - Identify and instruct in appropriate isolation precautions for identified infection/condition  Outcome: Progressing     Problem: SAFETY ADULT  Goal: Patient will remain free of falls  Description: INTERVENTIONS:  - Educate patient/family on patient safety including physical limitations  - Instruct patient to call for assistance with activity   - Consult OT/PT to assist with strengthening/mobility   - Keep Call bell within reach  - Keep bed low and locked with side rails adjusted as appropriate  - Keep care items and personal belongings within reach  - Initiate and maintain comfort rounds  - Make Fall Risk Sign visible to staff  -          - Apply yellow socks and bracelet for high fall risk patients  - Consider moving patient to room near nurses station  Outcome: Progressing     Problem: DISCHARGE PLANNING  Goal: Discharge to home or other facility with appropriate resources  Description: INTERVENTIONS:  - Identify barriers to discharge w/patient and caregiver  - Arrange for needed discharge resources and transportation as appropriate  - Identify discharge learning needs (meds, wound care, etc )  - Arrange for interpretive services to assist at discharge as needed  - Refer to Case Management Department for coordinating discharge planning if the patient needs post-hospital services based on physician/advanced practitioner order or complex needs related to functional status, cognitive ability, or social support system  Outcome: Progressing     Problem: Knowledge Deficit  Goal: Patient/family/caregiver demonstrates understanding of disease process, treatment plan, medications, and discharge instructions  Description: Complete learning assessment and assess knowledge base    Interventions:  - Provide teaching at level of understanding  - Provide teaching via preferred learning methods  Outcome: Progressing

## 2023-01-20 NOTE — PROGRESS NOTES
Olena U  66   Progress Note - Myriam Davila 1960, 58 y o  female MRN: [id number]  Unit/Bed#: -Dylan Encounter: [id number]  Primary Care Provider: Willie Lopes MD   Date and time admitted to hospital: 1/18/2023  1:59 PM    Stage 3a chronic kidney disease [facility])  Assessment & Plan  Lab Results   Component Value Date    EGFR 51 01/20/2023    EGFR 49 01/20/2023    EGFR 50 01/20/2023    CREATININE 1 14 01/20/2023    CREATININE 1 19 01/20/2023    CREATININE 1 17 01/20/2023   Mild acute kidney injury on chronic kidney disease with baseline creatinine around 1 5  Will give IV fluid hydration and monitor BUNs/creatinine, avoid nephrotoxins  Hypercholesterolemia  Assessment & Plan  Continue rosuvastatin    Class 3 severe obesity due to excess calories without serious comorbidity with body mass index (BMI) of 40 0 to 44 9 in adult [facility])  Assessment & Plan  Weight reduction and lifestyle modification advised  Benign essential hypertension  Assessment & Plan  Continue metoprolol and amlodipine  Arthritis  Assessment & Plan  Continue supportive care,    * Hyponatremia  Assessment & Plan    worsening hyponatremia noted  At the time of admission sodium was 122-123  Given gentle IV hydration with normal saline and initially thought to be due to increased free fluid consumption of at least 1 to 2 gallon per day  Overcorrection of sodium noted to 131 and IV fluid was changed to D5 and was given a dose of DDAVP and trended down to 124 and currently it is 120  Nephrology input highly appreciated  Patient was started on 1 g of sodium chloride tablets twice daily  Also will be started on hypertonic saline at 60 mL/h  Repeat sodium at 3 PM is 123  Will monitor sodium closely in a m  Hyponatremia work-up not clearly supportive of SIADH, urine osmolality is low , urine sodium less than 10, consistent with hypovolemia  Also serum osmolality is low    CT of the chest and abdomen shows no acute changes however there is 1 4 cm hypodense lesion in the right ovary and needs pelvic ultrasound and was ordered and patient refuses for the test currently  Will need outpatient pelvic ultrasound to follow-up  Continue strict free fluid restriction of 1 L/day, nephrology input highly appreciated  Problem  List  hyponatremia-worsening hyponatremia noted  At the time of admission sodium was 122-123  Given gentle IV hydration with normal saline and initially thought to be due to increased free fluid consumption of at least 1 to 2 gallon per day  Overcorrection of sodium noted to 131 and IV fluid was changed to D5 and was given a dose of DDAVP and trended down to 124 and currently it is 120  Nephrology input highly appreciated  Patient was started on 1 g of sodium chloride tablets twice daily  Also will be started on hypertonic saline at 60 mL/h  Repeat sodium at 3 PM is 123  Will monitor sodium closely in a m  Hyponatremia work-up not clearly supportive of SIADH, urine osmolality is low , urine sodium less than 10, consistent with hypovolemia  Also serum osmolality is low  CT of the chest and abdomen shows no acute changes however there is 1 4 cm hypodense lesion in the right ovary and needs pelvic ultrasound and was ordered and patient refuses for the test currently  Will need outpatient pelvic ultrasound to follow-up  Hyperlipidemia-on statin  Stage III CKD-at baseline, monitor BUNs/creatinine  Hypertension-controlled on metoprolol and amlodipine  Arthritis  Morbid obesity with BMI more than 43    VTE Pharmacologic Prophylaxis: VTE Score: 4 Moderate Risk (Score 3-4) - Pharmacological DVT Prophylaxis Ordered: enoxaparin (Lovenox)  Patient Centered Rounds: I performed bedside rounds with nursing staff today  Discussions with Specialists or Other Care Team Provider:     Education and Discussions with Family / Patient: Updated  (brother) via phone      Time Spent for Care: 60 minutes  More than 50% of total time spent on counseling and coordination of care as described above  Current Length of Stay: 0 day(s)  Current Patient Status: Inpatient   Certification Statement: The patient will continue to require additional inpatient hospital stay due to Hyponatremia  Discharge Plan: Anticipate discharge in 48 hrs to home with home services  Code Status: Level 1 - Full Code    Subjective:   Patient complains of generalized weakness however she states that she is very thirsty and has been drinking water  She patient is on strict fluid restriction of 1 L  She denies of nausea vomiting or diarrhea  Patient is afebrile  Objective:     Vitals:   Temp (24hrs), Av 3 °F (36 8 °C), Min:97 9 °F (36 6 °C), Max:98 8 °F (37 1 °C)    Temp:  [97 9 °F (36 6 °C)-98 8 °F (37 1 °C)] 98 2 °F (36 8 °C)  HR:  [59-71] 63  Resp:  [16-20] 16  BP: (119-155)/(53-80) 136/70  SpO2:  [96 %-100 %] 97 %  Body mass index is 43 6 kg/m²  Input and Output Summary (last 24 hours): Intake/Output Summary (Last 24 hours) at 2023 1721  Last data filed at 2023 1201  Gross per 24 hour   Intake 760 ml   Output 1051 ml   Net -291 ml       Physical Exam:   Physical Exam  Vitals reviewed  HEENT-PERRLA, moist oral mucosa  Neck-supple, no JVD elevation   Respiratory-equal air entry bilaterally, no rales or rhonchi  Cardiovascular system-S1, S2 heard, no murmur or gallops or rubs  Abdomen-soft, nontender, no guarding or rigidity, bowel sounds heard  Extremities-no pedal edema  Peripheral pulses palpable  Musculoskeletal-no contractures  Central nervous system-no acute focal neurological deficit ,no sensory or motor deficit noted    Skin-no rash noted        Additional Data:     Labs:  Results from last 7 days   Lab Units 23  0427   WBC Thousand/uL 7 40   HEMOGLOBIN g/dL 11 5   HEMATOCRIT % 34 0*   PLATELETS Thousands/uL 332   NEUTROS PCT % 60   LYMPHS PCT % 27   MONOS PCT % 9   EOS PCT % 3     Results from last 7 days   Lab Units 01/20/23  1517 01/18/23  1942 01/18/23  1413   SODIUM mmol/L 123*   < > 122*   POTASSIUM mmol/L 4 1   < > 4 2   CHLORIDE mmol/L 90*   < > 89*   CO2 mmol/L 23   < > 21   BUN mg/dL 12   < > 18   CREATININE mg/dL 1 10   < > 1 76*   ANION GAP mmol/L 10   < > 12   CALCIUM mg/dL 9 1   < > 9 9   ALBUMIN g/dL  --   --  4 2   TOTAL BILIRUBIN mg/dL  --   --  0 43   ALK PHOS U/L  --   --  83   ALT U/L  --   --  11   AST U/L  --   --  13   GLUCOSE RANDOM mg/dL 104   < > 114    < > = values in this interval not displayed  Lines/Drains:  Invasive Devices     Peripheral Intravenous Line  Duration           Peripheral IV 01/18/23 Dorsal (posterior); Right Hand 2 days                  Telemetry:  Telemetry Orders (From admission, onward)             24 Hour Telemetry Monitoring  Continuous x 24 Hours (Telem)        References:    Telemetry Guidelines   Question:  Reason for 24 Hour Telemetry  Answer:  Metabolic/Electrolyte Disturbance with High Probability of Dysrhythmia (K level <3 or >6, or KCL infusion >=10mEq/hr)                 Telemetry Reviewed: Normal Sinus Rhythm  Indication for Continued Telemetry Use: No indication for continued use  Will discontinue                Imaging: Reviewed radiology reports from this admission including: chest CT scan    Recent Cultures (last 7 days):         Last 24 Hours Medication List:   Current Facility-Administered Medications   Medication Dose Route Frequency Provider Last Rate   • acetaminophen  650 mg Oral Q6H PRN Con Dixon MD     • ALPRAZolam  1 mg Oral TID PRN Con Dixon MD     • amLODIPine  10 mg Oral Daily Cruzito Villasenor MD     • benztropine  0 5 mg Oral BID PRN Con Dixon MD     • docusate sodium  100 mg Oral BID Con Dixon MD     • enoxaparin  40 mg Subcutaneous Q12H [address] Vee Urban MD     • FLUoxetine  20 mg Oral Daily Vee Urban MD     • metoprolol succinate  100 mg Oral Daily Isis Miller MD     • OLANZapine  7 5 mg Oral HS Isis Miller MD     • ondansetron  4 mg Intravenous Q6H PRN Isis Miller MD     • pravastatin  40 mg Oral Daily With Gilles Elliott MD     • IV infusion builder   Intravenous Continuous Melissa Silverman MD 60 mL/hr at 01/20/23 1153   • sodium chloride  1 g Oral BID With Meals Melissa Silverman MD          Today, Patient Was Seen By: Isis Miller MD    **Please Note: This note may have been constructed using a voice recognition system  **

## 2023-01-20 NOTE — ASSESSMENT & PLAN NOTE
worsening hyponatremia noted  At the time of admission sodium was 122-123  Given gentle IV hydration with normal saline and initially thought to be due to increased free fluid consumption of at least 1 to 2 gallon per day  Overcorrection of sodium noted to 131 and IV fluid was changed to D5 and was given a dose of DDAVP and trended down to 124 and currently it is 120  Nephrology input highly appreciated  Patient was started on 1 g of sodium chloride tablets twice daily  Also will be started on hypertonic saline at 60 mL/h  Repeat sodium at 3 PM is 123  Will monitor sodium closely in a m  Hyponatremia work-up not clearly supportive of SIADH, urine osmolality is low , urine sodium less than 10, consistent with hypovolemia  Also serum osmolality is low  CT of the chest and abdomen shows no acute changes however there is 1 4 cm hypodense lesion in the right ovary and needs pelvic ultrasound and was ordered and patient refuses for the test currently  Will need outpatient pelvic ultrasound to follow-up  Continue strict free fluid restriction of 1 L/day, nephrology input highly appreciated

## 2023-01-20 NOTE — UTILIZATION REVIEW
NOTIFICATION OF INPATIENT ADMISSION   AUTHORIZATION REQUEST   SERVICING FACILITY:   [place] [address]  [id number] [place] [address] Noah Delcid, [address] [facility]  Tax ID: [id number]  NPI: [id number] ATTENDING PROVIDER:  Attending Name and NPI#: Wisam Hernandezma [[id number]]  Address: [address] [place] [address] Noah Delcid, [address] [place] [address]  Phone:  [phone]     ADMISSION INFORMATION:  Place of Service: [place] [address]  Place of Service Code: 21  Inpatient Admission Date/Time: 1/20/23 12:50 PM  Discharge Date/Time: No discharge date for patient encounter  Admitting Diagnosis Code/Description:  Hypomagnesemia [E83 42]  Hyponatremia [E87 1]  Abnormal laboratory test [R89 9]     UTILIZATION REVIEW CONTACT:  Cedrick Rinaldi Utilization   Network Utilization Review Department  Phone: [phone]  Fax: [phone]  Email: [email or web address]  Contact for approvals/pending authorizations, clinical reviews, and discharge  PHYSICIAN ADVISORY SERVICES:  Medical Necessity Denial & [facility] Review  Phone: [phone]  Fax: [phone]  Email: [email or web address]

## 2023-01-20 NOTE — ASSESSMENT & PLAN NOTE
Lab Results   Component Value Date    EGFR 51 01/20/2023    EGFR 49 01/20/2023    EGFR 50 01/20/2023    CREATININE 1 14 01/20/2023    CREATININE 1 19 01/20/2023    CREATININE 1 17 01/20/2023   Mild acute kidney injury on chronic kidney disease with baseline creatinine around 1 5  Will give IV fluid hydration and monitor BUNs/creatinine, avoid nephrotoxins

## 2023-01-20 NOTE — TREATMENT TEAM
Renal on call  Have added call parameters to evening labs due now  Update 1015, chart review  Labs not yet drawn   I have reached out to charge nurse on tt to attempt review further

## 2023-01-20 NOTE — PROGRESS NOTES
NEPHROLOGY PROGRESS NOTE    Naida Davila 58 y o  female MRN: [id number]  Unit/Bed#: -01 Encounter: [id number]  Reason for Consult: Hyponatremia    The patient is awake alert she is very pleasant and has a very nice laugh  As I am talking to her she says she likes to drink a lot of water as she is holding a water bottle  She claims that her mouth is dry and she is thirsty a lot  Otherwise no complaints no confusion  ASSESSMENT/PLAN:  1  Hyponatremia    Looking back in the record the patient had mild hyponatremia times in the past   Presents today with worsening hyponatremia  The patient had low serum osmolality but her urine osmolality was low as well as her urine sodium concentration  What is recorded does not show polyuria  She was placed on sodium chloride tablets high dose and fluid restriction  The patient states that she drinks a lot of water and in fact has been talking to her she has a small bottle of water in her hand  She does state that she eats regularly as well at home  She is also on medications that can cause SIADH  A m  cortisol acceptable  I make all the statements because there is multiple things that could be playing a role  First off the urine studies are not consistent with SIADH  Urine studies could suggest hypovolemia but in that situation urine osmolality should be higher than the serum osmolality  Polydipsia is playing a role here as she may be drinking more water than her kidneys can excrete but the urine is not maximally dilute which indicates the presence of ADH  Potentially the patient's mildly volume depleted shown by the urine sodium concentration being low  This may limit her urine volume  And if she is drinking excessive water it may balance towards water retention and hyponatremia  The patient was given isotonic fluids and sodium concentration improved from 122 mEq/L to 131 mEq/L    The patient was given DDAVP and a small water bolus and sodium concentration dropped and now stable 124 mEq/L  Repeat labs sent around now we will assess where they are at  Fluid restriction, reduce sodium chloride to 1 g twice daily for now  May reimplement isotonic fluids and monitor based on results  As an addendum I was contacted that sodium concentration dropped to 120 mEq/L  At this point I am going to give her hypertonic saline at 60 cc/h repeat BMP this afternoon and will follow  Given that urine osmolality was low this should be excreted in a higher volume and the improvement of sodium concentration  SUBJECTIVE:  Review of Systems   Constitutional: Negative for chills, diaphoresis, fever and malaise/fatigue  HENT:        Feels thirsty and has a dry mouth  Eyes: Negative  Cardiovascular: Negative for chest pain, dyspnea on exertion, leg swelling and orthopnea  Respiratory: Negative  Negative for cough, shortness of breath, sputum production and wheezing  Gastrointestinal: Negative for abdominal pain, diarrhea, nausea and vomiting  Genitourinary: Negative for dysuria, flank pain, hematuria and incomplete emptying  Neurological: Negative for dizziness, focal weakness, headaches and weakness  Psychiatric/Behavioral: Negative for altered mental status, depression, hallucinations and hypervigilance  OBJECTIVE:  Current Weight: Weight - Scale: 115 kg (254 lb 0 2 oz)  Vitals:Temp (24hrs), Av 2 °F (36 8 °C), Min:97 7 °F (36 5 °C), Max:98 8 °F (37 1 °C)  Current: Temperature: 98 8 °F (37 1 °C)   Blood pressure 130/70, pulse 59, temperature 98 8 °F (37 1 °C), temperature source Oral, resp  rate 20, height 5' 4" (1 626 m), weight 115 kg (254 lb 0 2 oz), SpO2 100 %  , Body mass index is 43 6 kg/m²        Intake/Output Summary (Last 24 hours) at 2023 0852  Last data filed at 2023 0801  Gross per 24 hour   Intake 720 ml   Output 1651 ml   Net -931 ml       Physical Exam: /70 (BP Location: Right arm)   Pulse 59   Temp 98 8 °F (37 1 °C) (Oral)   Resp 20   Ht 5' 4" (1 626 m)   Wt 115 kg (254 lb 0 2 oz)   SpO2 100%   BMI 43 60 kg/m²   Physical Exam  Constitutional:       General: She is not in acute distress  Appearance: She is not ill-appearing or toxic-appearing  HENT:      Head: Normocephalic and atraumatic  Nose: Nose normal       Mouth/Throat:      Mouth: Mucous membranes are dry  Eyes:      General: No scleral icterus  Extraocular Movements: Extraocular movements intact  Cardiovascular:      Rate and Rhythm: Normal rate and regular rhythm  Heart sounds: No friction rub  No gallop  Comments: No edema  Pulmonary:      Effort: Pulmonary effort is normal  No respiratory distress  Breath sounds: No wheezing, rhonchi or rales  Abdominal:      General: Bowel sounds are normal  There is no distension  Palpations: Abdomen is soft  Tenderness: There is no abdominal tenderness  There is no rebound  Musculoskeletal:      Cervical back: Normal range of motion and neck supple  Neurological:      General: No focal deficit present  Mental Status: She is alert and oriented to person, place, and time  Mental status is at baseline  Psychiatric:         Mood and Affect: Mood normal          Behavior: Behavior normal          Thought Content:  Thought content normal          Judgment: Judgment normal          Medications:    Current Facility-Administered Medications:   •  acetaminophen (TYLENOL) tablet 650 mg, 650 mg, Oral, Q6H PRN, Kassandra Salcedo MD  •  ALPRAZolam Christie Chough) tablet 1 mg, 1 mg, Oral, TID PRN, Kassandra Salcedo MD, 1 mg at 01/19/23 1743  •  amLODIPine (NORVASC) tablet 10 mg, 10 mg, Oral, Daily, Donna Diaz MD, 10 mg at 01/20/23 0836  •  benztropine (COGENTIN) tablet 0 5 mg, 0 5 mg, Oral, BID PRN, Kassandra Salcedo MD, 0 5 mg at 01/19/23 2251  •  docusate sodium (COLACE) capsule 100 mg, 100 mg, Oral, BID, [employer]ee[employer], MD, 100 mg at 01/20/23 0836  •  enoxaparin (LOVENOX) subcutaneous injection 40 mg, 40 mg, Subcutaneous, Q12H [address], Solis Kathleen MD, 40 mg at 01/20/23 7991  •  FLUoxetine (PROzac) capsule 20 mg, 20 mg, Oral, Daily, Solis Kathleen MD, 20 mg at 01/20/23 0836  •  metoprolol succinate (TOPROL-XL) 24 hr tablet 100 mg, 100 mg, Oral, Daily, Solis Kathleen MD, 100 mg at 01/20/23 0836  •  OLANZapine (ZyPREXA) tablet 7 5 mg, 7 5 mg, Oral, HS, Solis Kathleen MD, 7 5 mg at 01/19/23 2128  •  ondansetron (ZOFRAN) injection 4 mg, 4 mg, Intravenous, Q6H PRN, Solis Kathleen MD, 4 mg at 01/20/23 0434  •  pravastatin (PRAVACHOL) tablet 40 mg, 40 mg, Oral, Daily With Citlalli Cespedes MD, 40 mg at 01/19/23 1739  •  sodium chloride tablet 2 g, 2 g, Oral, 4x Daily (with meals and at bedtime), Gladystine MD Jess, 2 g at 01/20/23 0837    Laboratory Results:  Lab Results   Component Value Date    WBC 7 40 01/20/2023    HGB 11 5 01/20/2023    HCT 34 0 (L) 01/20/2023    MCV 84 01/20/2023     01/20/2023     Lab Results   Component Value Date    SODIUM 124 (L) 01/20/2023    K 4 0 01/20/2023    CL 91 (L) 01/20/2023    CO2 23 01/20/2023    BUN 10 01/20/2023    CREATININE 1 19 01/20/2023    GLUC 85 01/20/2023    CALCIUM 9 0 01/20/2023     Lab Results   Component Value Date    CALCIUM 9 0 01/20/2023    PHOS 3 6 01/18/2023     No results found for: LABPROT

## 2023-01-21 LAB
ANION GAP SERPL CALCULATED.3IONS-SCNC: 8 MMOL/L (ref 4–13)
BUN SERPL-MCNC: 10 MG/DL (ref 5–25)
CALCIUM SERPL-MCNC: 9.5 MG/DL (ref 8.4–10.2)
CHLORIDE SERPL-SCNC: 97 MMOL/L (ref 96–108)
CO2 SERPL-SCNC: 27 MMOL/L (ref 21–32)
CREAT SERPL-MCNC: 1.2 MG/DL (ref 0.6–1.3)
GFR SERPL CREATININE-BSD FRML MDRD: 48 ML/MIN/1.73SQ M
GLUCOSE SERPL-MCNC: 92 MG/DL (ref 65–140)
POTASSIUM SERPL-SCNC: 4.5 MMOL/L (ref 3.5–5.3)
SODIUM SERPL-SCNC: 132 MMOL/L (ref 135–147)

## 2023-01-21 RX ORDER — MIRTAZAPINE 15 MG/1
30 TABLET, FILM COATED ORAL
Status: DISCONTINUED | OUTPATIENT
Start: 2023-01-21 | End: 2023-01-22 | Stop reason: HOSPADM

## 2023-01-21 RX ADMIN — AMLODIPINE BESYLATE 10 MG: 10 TABLET ORAL at 10:00

## 2023-01-21 RX ADMIN — ENOXAPARIN SODIUM 40 MG: 40 INJECTION SUBCUTANEOUS at 10:00

## 2023-01-21 RX ADMIN — BENZTROPINE MESYLATE 0.5 MG: 1 TABLET ORAL at 20:43

## 2023-01-21 RX ADMIN — ENOXAPARIN SODIUM 40 MG: 40 INJECTION SUBCUTANEOUS at 20:43

## 2023-01-21 RX ADMIN — DOCUSATE SODIUM 100 MG: 100 CAPSULE, LIQUID FILLED ORAL at 10:00

## 2023-01-21 RX ADMIN — PRAVASTATIN SODIUM 40 MG: 40 TABLET ORAL at 17:21

## 2023-01-21 RX ADMIN — OLANZAPINE 7.5 MG: 2.5 TABLET, FILM COATED ORAL at 21:02

## 2023-01-21 RX ADMIN — ALPRAZOLAM 1 MG: 0.5 TABLET ORAL at 20:42

## 2023-01-21 RX ADMIN — METOPROLOL SUCCINATE 100 MG: 100 TABLET, EXTENDED RELEASE ORAL at 10:00

## 2023-01-21 RX ADMIN — DOCUSATE SODIUM 100 MG: 100 CAPSULE, LIQUID FILLED ORAL at 17:21

## 2023-01-21 RX ADMIN — FLUOXETINE 20 MG: 20 CAPSULE ORAL at 10:00

## 2023-01-21 NOTE — PLAN OF CARE
Problem: METABOLIC, FLUID AND ELECTROLYTES - ADULT  Goal: Electrolytes maintained within normal limits  Description: INTERVENTIONS:  - Monitor labs and assess patient for signs and symptoms of electrolyte imbalances  - Administer electrolyte replacement as ordered  - Monitor response to electrolyte replacements, including repeat lab results as appropriate  - Instruct patient on fluid and nutrition as appropriate  Outcome: Progressing     Problem: PAIN - ADULT  Goal: Verbalizes/displays adequate comfort level or baseline comfort level  Description: Interventions:  - Encourage patient to monitor pain and request assistance  - Assess pain using appropriate pain scale  - Administer analgesics based on type and severity of pain and evaluate response  - Implement non-pharmacological measures as appropriate and evaluate response  - Consider cultural and social influences on pain and pain management  - Notify physician/advanced practitioner if interventions unsuccessful or patient reports new pain  Outcome: Progressing     Problem: INFECTION - ADULT  Goal: Absence or prevention of progression during hospitalization  Description: INTERVENTIONS:  - Assess and monitor for signs and symptoms of infection  - Monitor lab/diagnostic results  - Monitor all insertion sites, i e  indwelling lines, tubes, and drains  - Monitor endotracheal if appropriate and nasal secretions for changes in amount and color  - [place] appropriate cooling/warming therapies per order  - Administer medications as ordered  - Instruct and encourage patient and family to use good hand hygiene technique  - Identify and instruct in appropriate isolation precautions for identified infection/condition  Outcome: Progressing  Goal: Absence of fever/infection during neutropenic period  Description: INTERVENTIONS:  - Monitor WBC    Outcome: Progressing     Problem: SAFETY ADULT  Goal: Patient will remain free of falls  Description: INTERVENTIONS:  - Educate patient/family on patient safety including physical limitations  - Instruct patient to call for assistance with activity   - Consult OT/PT to assist with strengthening/mobility   - Keep Call bell within reach  - Keep bed low and locked with side rails adjusted as appropriate  - Keep care items and personal belongings within reach  - Initiate and maintain comfort rounds  - Make Fall Risk Sign visible to staff  - Apply yellow socks and bracelet for high fall risk patients  - Consider moving patient to room near nurses station  Outcome: Progressing  Goal: Maintain or return to baseline ADL function  Description: INTERVENTIONS:  -  Assess patient's ability to carry out ADLs; assess patient's baseline for ADL function and identify physical deficits which impact ability to perform ADLs (bathing, care of mouth/teeth, toileting, grooming, dressing, etc )  - Assess/evaluate cause of self-care deficits   - Assess range of motion  - Assess patient's mobility; develop plan if impaired  - Assess patient's need for assistive devices and provide as appropriate  - Encourage maximum independence but intervene and supervise when necessary  - Involve family in performance of ADLs  - Assess for home care needs following discharge   - Consider OT consult to assist with ADL evaluation and planning for discharge  - Provide patient education as appropriate  Outcome: Not Progressing  Goal: Maintains/Returns to pre admission functional level  Description: INTERVENTIONS:  - Perform BMAT or MOVE assessment daily    - Set and communicate daily mobility goal to care team and patient/family/caregiver     - Collaborate with rehabilitation services on mobility goals if consulted  - Out of bed for toileting  - Record patient progress and toleration of activity level   Outcome: Not Progressing     Problem: DISCHARGE PLANNING  Goal: Discharge to home or other facility with appropriate resources  Description: INTERVENTIONS:  - Identify barriers to discharge w/patient and caregiver  - Arrange for needed discharge resources and transportation as appropriate  - Identify discharge learning needs (meds, wound care, etc )  - Arrange for interpretive services to assist at discharge as needed  - Refer to Case Management Department for coordinating discharge planning if the patient needs post-hospital services based on physician/advanced practitioner order or complex needs related to functional status, cognitive ability, or social support system  Outcome: Not Progressing     Problem: Knowledge Deficit  Goal: Patient/family/caregiver demonstrates understanding of disease process, treatment plan, medications, and discharge instructions  Description: Complete learning assessment and assess knowledge base  Interventions:  - Provide teaching at level of understanding  - Provide teaching via preferred learning methods  Outcome: Not Progressing     Problem: Nutrition/Hydration-ADULT  Goal: Nutrient/Hydration intake appropriate for improving, restoring or maintaining nutritional needs  Description: Monitor and assess patient's nutrition/hydration status for malnutrition  Collaborate with interdisciplinary team and initiate plan and interventions as ordered  Monitor patient's weight and dietary intake as ordered or per policy  Utilize nutrition screening tool and intervene as necessary  Determine patient's food preferences and provide high-protein, high-caloric foods as appropriate       INTERVENTIONS:  - Monitor oral intake, urinary output, labs, and treatment plans  - Assess nutrition and hydration status and recommend course of action  - Evaluate amount of meals eaten  - Assist patient with eating if necessary   - Allow adequate time for meals  - Recommend/ encourage appropriate diets, oral nutritional supplements, and vitamin/mineral supplements  - Order, calculate, and assess calorie counts as needed  - Assess need for intravenous fluids  - Provide specific nutrition/hydration education as appropriate  - Include patient/family/caregiver in decisions related to nutrition  Outcome: Not Progressing

## 2023-01-21 NOTE — ASSESSMENT & PLAN NOTE
Lab Results   Component Value Date    EGFR 48 01/21/2023    EGFR 50 01/20/2023    EGFR 53 01/20/2023    CREATININE 1 20 01/21/2023    CREATININE 1 16 01/20/2023    CREATININE 1 10 01/20/2023   Mild acute kidney injury on chronic kidney disease with baseline creatinine around 1 5  Will give IV fluid hydration and monitor BUNs/creatinine, avoid nephrotoxins

## 2023-01-21 NOTE — PROGRESS NOTES
[facility] PROGRESS NOTE   Brittnee Davila 58 y o  female MRN: [id number]  Unit/Bed#: -01 Encounter: [id number]  Reason for Consult: Hyponatremia    ASSESSMENT and PLAN:  70-year-old female with history of anxiety, depression, CKD stage III, dyslipidemia, hypertension who presented with hyponatremia  We are consulted for management of hyponatremia    # Hyponatremia  - Baseline serum sodium appears to be around 130-136 since 2020  - Serum sodium on admission 122 01/18/2023  - Serum sodium 131 on 01/19/2023 suggestive of overcorrection status post DDAVP and D5W  - Serum sodium 120 on 01/20/2023 status post hypertonic saline  - Serum osmolality of 262 consistent with hypotonic hyponatremia  - Urine osmolality of 186 and urine sodium of 9  - TSH within normal limits rules out hypothyroidism  - A m  cortisol 30 8 rules out hypocortisolism  - Uric acid 4 5 within normal limits not consistent with SIADH  >>Etiology of hyponatremia is likely multifactorial: Most likely etiology is polydipsia +/- volume depletion    PLAN:  - Sodium correction is currently appropriate  - Discontinue 1 8% saline  - Continue fluid restriction  - Trend BMP daily    # CKD stage IIIb  - Outpatient nephrologist Dr Haim Alvarado  - Most likely hypertensive nephrosclerosis as an etiology  - Baseline creatinine: 1 4-1 7  - Serum creatinine currently at baseline    # Hypertension  - Currently on amlodipine 10 mg daily and Toprol- mg daily    SUBJECTIVE / 24H INTERVAL HISTORY:  Urine output recorded as 2400 cc  Serum sodium improved to 132 this morning  Patient is currently asymptomatic  Review of Systems   Constitutional: Negative for chills and fever  HENT: Negative for ear pain and sore throat  Eyes: Negative for pain and visual disturbance  Respiratory: Negative for cough and shortness of breath  Cardiovascular: Negative for chest pain and palpitations  Gastrointestinal: Negative for abdominal pain and vomiting  Genitourinary: Negative for dysuria and hematuria  Musculoskeletal: Negative for arthralgias and back pain  Skin: Negative for color change and rash  Neurological: Negative for seizures and syncope  All other systems reviewed and are negative  OBJECTIVE:  Current Weight: Weight - Scale: 115 kg (254 lb 0 2 oz)  Vitals:    01/20/23 1138 01/20/23 1532 01/20/23 2300 01/21/23 0801   BP: 125/76 136/70 153/79 145/82   BP Location: Left arm Left arm Left arm Right arm   Pulse: 62 63 64 70   Resp: 16 16 16 20   Temp: 98 4 °F (36 9 °C) 98 2 °F (36 8 °C) (!) 97 °F (36 1 °C) 98 8 °F (37 1 °C)   TempSrc: Oral Tympanic Tympanic Oral   SpO2: 99% 97% 95% 97%   Weight:       Height:           Intake/Output Summary (Last 24 hours) at 1/21/2023 1008  Last data filed at 1/21/2023 0700  Gross per 24 hour   Intake 1580 ml   Output 2400 ml   Net -820 ml     Physical Exam  Vitals and nursing note reviewed  Constitutional:       General: She is not in acute distress  Appearance: She is well-developed  She is obese  HENT:      Head: Normocephalic and atraumatic  Eyes:      Conjunctiva/sclera: Conjunctivae normal    Cardiovascular:      Rate and Rhythm: Normal rate and regular rhythm  Pulses: Normal pulses  Heart sounds: Normal heart sounds  No murmur heard  Pulmonary:      Effort: Pulmonary effort is normal  No respiratory distress  Breath sounds: Normal breath sounds  Abdominal:      Palpations: Abdomen is soft  Tenderness: There is no abdominal tenderness  Musculoskeletal:         General: No swelling  Cervical back: Neck supple  Right lower leg: No edema  Left lower leg: No edema  Skin:     General: Skin is warm and dry  Capillary Refill: Capillary refill takes less than 2 seconds  Neurological:      Mental Status: She is alert     Psychiatric:         Mood and Affect: Mood normal          Medications:    Current Facility-Administered Medications:   •  acetaminophen (TYLENOL) tablet 650 mg, 650 mg, Oral, Q6H PRN, EVELYNE Gao MD  •  ALPRAZolam Kim Sinks) tablet 1 mg, 1 mg, Oral, TID PRN, Yehuda Hodges MD, 1 mg at 01/20/23 2055  •  amLODIPine (NORVASC) tablet 10 mg, 10 mg, Oral, Daily, Hunter Oliveira MD, 10 mg at 01/21/23 1000  •  benztropine (COGENTIN) tablet 0 5 mg, 0 5 mg, Oral, BID PRN, Yehuda Hodges MD, 0 5 mg at 01/19/23 2257  •  docusate sodium (COLACE) capsule 100 mg, 100 mg, Oral, BID, Yehuda Hodges MD, 100 mg at 01/21/23 1000  •  enoxaparin (LOVENOX) subcutaneous injection 40 mg, 40 mg, Subcutaneous, Q12H [facility], Vee Gao MD, 40 mg at 01/21/23 1000  •  FLUoxetine (PROzac) capsule 20 mg, 20 mg, Oral, Daily, Yehuda Hodges MD, 20 mg at 01/21/23 1000  •  metoprolol succinate (TOPROL-XL) 24 hr tablet 100 mg, 100 mg, Oral, Daily, Yehuda Hodges MD, 100 mg at 01/21/23 1000  •  OLANZapine (ZyPREXA) tablet 7 5 mg, 7 5 mg, Oral, HS, eVe Gao MD, 7 5 mg at 01/20/23 2120  •  [facility]ansetron [facility]) injection 4 mg, 4 mg, Intravenous, Q6H PRN, Yehuda Hodges MD, 4 mg at 01/20/23 0434  •  pravastatin (PRAVACHOL) tablet 40 mg, 40 mg, Oral, Daily With Mili Guillen MD, 40 mg at 01/20/23 1648    Laboratory Results:  Results from last 7 days   Lab Units 01/21/23  0513 01/20/23  2237 01/20/23  1517 01/20/23  0844 01/20/23  0427 01/20/23  0157 01/19/23  2225 01/19/23  1220 01/19/23  0530 01/19/23  0018 01/18/23  1942 01/18/23  1413   WBC Thousand/uL  --   --   --   --  7 40  --   --   --  7 00  --   --  9 53   HEMOGLOBIN g/dL  --   --   --   --  11 5  --   --   --  12 1  --   --  13 0   HEMATOCRIT %  --   --   --   --  34 0*  --   --   --  36 3  --   --  37 6   PLATELETS Thousands/uL  --   --   --   --  332  --   --   --  367  --  341 414*   POTASSIUM mmol/L 4 5 4 1 4 1 4 6 4 0 3 8 3 9   < > 4 1   < > 3 5 4 2   CHLORIDE mmol/L 97 95* 90* 89* 91* 93* 92*   < > 97   < > 92* 89*   CO2 mmol/L 27 24 23 23 23 23 22   < > 23   < > 21 21   BUN mg/dL 10 12 12 9 10 12 14   < > 15   < > 19 18   CREATININE mg/dL 1 20 1 16 1 10 1 14 1 19 1 17 1 23   < > 1 53*   < > 1 75* 1 76*   CALCIUM mg/dL 9 5 8 9 9 1 8 9 9 0 8 5 8 5   < > 9 5   < > 9 0 9 9   MAGNESIUM mg/dL  --   --   --   --  1 5*  --   --   --   --   --   --  1 6*   PHOSPHORUS mg/dL  --   --   --   --   --   --   --   --   --   --   --  3 6    < > = values in this interval not displayed  Portions of the record may have been created with voice recognition software  Occasional wrong word or "sound a like" substitutions may have occurred due to the inherent limitations of voice recognition software  Read the chart carefully and recognize, using context, where substitutions have occurred  If you have any questions, please contact the dictating provider

## 2023-01-21 NOTE — PLAN OF CARE
Problem: METABOLIC, FLUID AND ELECTROLYTES - ADULT  Goal: Electrolytes maintained within normal limits  Description: INTERVENTIONS:  - Monitor labs and assess patient for signs and symptoms of electrolyte imbalances  - Administer electrolyte replacement as ordered  - Monitor response to electrolyte replacements, including repeat lab results as appropriate  - Instruct patient on fluid and nutrition as appropriate  Outcome: Progressing     Problem: PAIN - ADULT  Goal: Verbalizes/displays adequate comfort level or baseline comfort level  Description: Interventions:  - Encourage patient to monitor pain and request assistance  - Assess pain using appropriate pain scale  - Administer analgesics based on type and severity of pain and evaluate response  - Implement non-pharmacological measures as appropriate and evaluate response  - Consider cultural and social influences on pain and pain management  - Notify physician/advanced practitioner if interventions unsuccessful or patient reports new pain  Outcome: Progressing     Problem: INFECTION - ADULT  Goal: Absence or prevention of progression during hospitalization  Description: INTERVENTIONS:  - Assess and monitor for signs and symptoms of infection  - Monitor lab/diagnostic results  - Monitor all insertion sites, i e  indwelling lines, tubes, and drains  - Monitor endotracheal if appropriate and nasal secretions for changes in amount and color  - [place] appropriate cooling/warming therapies per order  - Administer medications as ordered  - Instruct and encourage patient and family to use good hand hygiene technique  - Identify and instruct in appropriate isolation precautions for identified infection/condition  Outcome: Progressing     Problem: SAFETY ADULT  Goal: Patient will remain free of falls  Description: INTERVENTIONS:  - Educate patient/family on patient safety including physical limitations  - Instruct patient to call for assistance with activity   - Consult OT/PT to assist with strengthening/mobility   - Keep Call bell within reach  - Keep bed low and locked with side rails adjusted as appropriate  - Keep care items and personal belongings within reach  - Initiate and maintain comfort rounds  - Make Fall Risk Sign visible to staff  - Apply yellow socks and bracelet for high fall risk patients  - Consider moving patient to room near nurses station  Outcome: Progressing

## 2023-01-21 NOTE — ASSESSMENT & PLAN NOTE
Lab Results   Component Value Date    EGFR 48 01/21/2023    EGFR 50 01/20/2023    EGFR 53 01/20/2023    CREATININE 1 20 01/21/2023    CREATININE 1 16 01/20/2023    CREATININE 1 10 01/20/2023     · Baseline Crt appears to be around 1 4-1 7  · Currently stable at baseline  · Recommend continued outpatient follow up with Nephrology upon discharge

## 2023-01-21 NOTE — ASSESSMENT & PLAN NOTE
· POA sodium was 122-123  · S/p IV hydration with normal saline   · Likely secondary to increased free fluid consumption of at least 1 to 2 gallon per day  · Overcorrection of sodium IV fluid was changed to D5 and was given a dose of DDAVP  · Patient was started on 1 g of sodium chloride tablets BID  Also started on hypertonic saline at 60 mL/h  · CT of the chest and abdomen shows no acute changes  · Continue FR  · Fluoxetine discontinued which could be contributing to hyponatremia and will switch to Remeron as per psychiatry input  · Recommend outpatient follow up with Psychiatry  · Nephrology consulted, appreciate recommendations:  · Recommend restarting salt tablets 1G TID  · Continue fluid restriction of 1500 mL on discharge  · May need to add low-dose torsemide based on results of repeat BMP outpatient    · OK to discharge from nephrology perspective  · Recommend outpatient follow up with Primary Nephrologist  · Recommend repeat BMP within 5-7 days of discharge

## 2023-01-21 NOTE — PROGRESS NOTES
[facility]  Progress Note - Mikayla Davila 1960, 58 y o  female MRN: [id number]  Unit/Bed#: -Dylan Encounter: [id number]  Primary Care Provider: Andres Hooper MD   Date and time admitted to hospital: 1/18/2023  1:59 PM    Stage 3a chronic kidney disease [facility])  Assessment & Plan  Lab Results   Component Value Date    EGFR 48 01/21/2023    EGFR 50 01/20/2023    EGFR 53 01/20/2023    CREATININE 1 20 01/21/2023    CREATININE 1 16 01/20/2023    CREATININE 1 10 01/20/2023   Mild acute kidney injury on chronic kidney disease with baseline creatinine around 1 5  Will give IV fluid hydration and monitor BUNs/creatinine, avoid nephrotoxins  Hypercholesterolemia  Assessment & Plan  Continue rosuvastatin    Class 3 severe obesity due to excess calories without serious comorbidity with body mass index (BMI) of 40 0 to 44 9 in adult [facility])  Assessment & Plan  Weight reduction and lifestyle modification advised  Benign essential hypertension  Assessment & Plan  Continue metoprolol and amlodipine  Arthritis  Assessment & Plan  Continue supportive care,    * Hyponatremia  Assessment & Plan    worsening hyponatremia noted  At the time of admission sodium was 122-123  Given gentle IV hydration with normal saline and initially thought to be due to increased free fluid consumption of at least 1 to 2 gallon per day  Overcorrection of sodium noted to 131 and IV fluid was changed to D5 and was given a dose of DDAVP and trended down to 124 and currently it is 120  Nephrology input highly appreciated  Patient was started on 1 g of sodium chloride tablets twice daily  Also will be started on hypertonic saline at 60 mL/h  Repeat sodium at 3 PM is 123  Will monitor sodium closely in a m  Hyponatremia work-up not clearly supportive of SIADH, urine osmolality is low , urine sodium less than 10, consistent with hypovolemia  Also serum osmolality is low    CT of the chest and abdomen shows no acute changes however there is 1 4 cm hypodense lesion in the right ovary and needs pelvic ultrasound and was ordered and patient refuses for the test currently  Will need outpatient pelvic ultrasound to follow-up  Continue strict free fluid restriction of 1 L/day, nephrology input highly appreciated  Will discontinue hypertonic saline , sodium level has improved to 1932  Continue fluid restriction at 1500 mL, will monitor sodium level, continue salt tablets have been discontinued, nephrology input appreciated,  Fluoxetine will be discontinued which could be contributing to hyponatremia and will switch to Remeron as per psychiatry input  VTE Pharmacologic Prophylaxis: VTE Score: 4 Moderate Risk (Score 3-4) - Pharmacological DVT Prophylaxis Ordered: enoxaparin (Lovenox)  Patient Centered Rounds: I performed bedside rounds with nursing staff today  Discussions with Specialists or Other Care Team Provider: staff    Education and Discussions with Family / Patient: Updated  (sister) via phone  Time Spent for Care: 60 minutes  More than 50% of total time spent on counseling and coordination of care as described above  Current Length of Stay: 1 day(s)  Current Patient Status: Inpatient   Certification Statement: The patient will continue to require additional inpatient hospital stay due to hyponatremia   Discharge Plan: Anticipate discharge in 48 hrs to home  Code Status: Level 1 - Full Code    Subjective:   Pt has no new generalized weakness,pt has no fever or chills ,pt has no abdominal pain ,    Objective:     Vitals:   Temp (24hrs), Av °F (36 7 °C), Min:97 °F (36 1 °C), Max:98 8 °F (37 1 °C)    Temp:  [97 °F (36 1 °C)-98 8 °F (37 1 °C)] 98 8 °F (37 1 °C)  HR:  [63-70] 70  Resp:  [16-20] 20  BP: (136-153)/(70-82) 145/82  SpO2:  [95 %-97 %] 97 %  Body mass index is 43 6 kg/m²  Input and Output Summary (last 24 hours):      Intake/Output Summary (Last 24 hours) at 1/21/2023 1244  Last data filed at 1/21/2023 0700  Gross per 24 hour   Intake 1340 ml   Output 2400 ml   Net -1060 ml       Physical Exam:   Physical Exam   HEENT-PERRLA, moist oral mucosa  Neck-supple, no JVD elevation   Respiratory-equal air entry bilaterally, no rales or rhonchi  Cardiovascular system-S1, S2 heard, no murmur or gallops or rubs  Abdomen-soft, nontender, no guarding or rigidity, bowel sounds heard  Extremities-no pedal edema  Peripheral pulses palpable  Musculoskeletal-no contractures  Central nervous system-no acute focal neurological deficit ,no sensory or motor deficit noted  Skin-no rash noted        Additional Data:     Labs:  Results from last 7 days   Lab Units 01/20/23  0427   WBC Thousand/uL 7 40   HEMOGLOBIN g/dL 11 5   HEMATOCRIT % 34 0*   PLATELETS Thousands/uL 332   NEUTROS PCT % 60   LYMPHS PCT % 27   MONOS PCT % 9   EOS PCT % 3     Results from last 7 days   Lab Units 01/21/23  0513 01/18/23  1942 01/18/23  1413   SODIUM mmol/L 132*   < > 122*   POTASSIUM mmol/L 4 5   < > 4 2   CHLORIDE mmol/L 97   < > 89*   CO2 mmol/L 27   < > 21   BUN mg/dL 10   < > 18   CREATININE mg/dL 1 20   < > 1 76*   ANION GAP mmol/L 8   < > 12   CALCIUM mg/dL 9 5   < > 9 9   ALBUMIN g/dL  --   --  4 2   TOTAL BILIRUBIN mg/dL  --   --  0 43   ALK PHOS U/L  --   --  83   ALT U/L  --   --  11   AST U/L  --   --  13   GLUCOSE RANDOM mg/dL 92   < > 114    < > = values in this interval not displayed                         Lines/Drains:  Invasive Devices     Peripheral Intravenous Line  Duration           Peripheral IV 01/20/23 Right Antecubital <1 day                      Imaging: Reviewed radiology reports from this admission including: chest CT scan and abdominal/pelvic CT    Recent Cultures (last 7 days):         Last 24 Hours Medication List:   Current Facility-Administered Medications   Medication Dose Route Frequency Provider Last Rate   • acetaminophen  650 mg Oral Q6H PRN Vee Perez MD • ALPRAZolam  1 mg Oral TID PRN Ann-Marie Mayberry MD     • amLODIPine  10 mg Oral Daily Vance Quinn MD     • benztropine  0 5 mg Oral BID PRN Ann-Marie Mayberry MD     • docusate sodium  100 mg Oral BID Ann-Marie Mayberry MD     • enoxaparin  40 mg Subcutaneous Q12H [facility] Vee Young MD     • metoprolol succinate  100 mg Oral Daily Ann-Marie Mayberry MD     • mirtazapine  30 mg Oral HS Vee Young MD     • OLANZapine  7 5 mg Oral HS Vee Young MD     • ondansetron  4 mg Intravenous Q6H PRN Ann-Marie Mayberry MD     • pravastatin  40 mg Oral Daily With Chapito Muro MD          Today, Patient Was Seen By: Ann-Marie Mayberry MD    **Please Note: This note may have been constructed using a voice recognition system  **

## 2023-01-22 VITALS
HEIGHT: 64 IN | WEIGHT: 254.01 LBS | HEART RATE: 62 BPM | BODY MASS INDEX: 43.37 KG/M2 | TEMPERATURE: 98.4 F | DIASTOLIC BLOOD PRESSURE: 73 MMHG | RESPIRATION RATE: 16 BRPM | SYSTOLIC BLOOD PRESSURE: 146 MMHG | OXYGEN SATURATION: 95 %

## 2023-01-22 PROBLEM — R93.5 ABNORMAL CT OF THE ABDOMEN: Status: ACTIVE | Noted: 2023-01-22

## 2023-01-22 LAB
ANION GAP SERPL CALCULATED.3IONS-SCNC: 7 MMOL/L (ref 4–13)
BUN SERPL-MCNC: 17 MG/DL (ref 5–25)
CALCIUM SERPL-MCNC: 9.3 MG/DL (ref 8.4–10.2)
CHLORIDE SERPL-SCNC: 97 MMOL/L (ref 96–108)
CO2 SERPL-SCNC: 27 MMOL/L (ref 21–32)
CREAT SERPL-MCNC: 1.22 MG/DL (ref 0.6–1.3)
ERYTHROCYTE [DISTWIDTH] IN BLOOD BY AUTOMATED COUNT: 13.2 % (ref 11.6–15.1)
GFR SERPL CREATININE-BSD FRML MDRD: 47 ML/MIN/1.73SQ M
GLUCOSE SERPL-MCNC: 87 MG/DL (ref 65–140)
HCT VFR BLD AUTO: 37 % (ref 34.8–46.1)
HGB BLD-MCNC: 11.5 G/DL (ref 11.5–15.4)
MCH RBC QN AUTO: 28.1 PG (ref 26.8–34.3)
MCHC RBC AUTO-ENTMCNC: 31.1 G/DL (ref 31.4–37.4)
MCV RBC AUTO: 91 FL (ref 82–98)
PLATELET # BLD AUTO: 237 THOUSANDS/UL (ref 149–390)
PMV BLD AUTO: 9.4 FL (ref 8.9–12.7)
POTASSIUM SERPL-SCNC: 4.5 MMOL/L (ref 3.5–5.3)
RBC # BLD AUTO: 4.09 MILLION/UL (ref 3.81–5.12)
SODIUM SERPL-SCNC: 131 MMOL/L (ref 135–147)
WBC # BLD AUTO: 7.7 THOUSAND/UL (ref 4.31–10.16)

## 2023-01-22 RX ORDER — SODIUM CHLORIDE 1000 MG
1 TABLET, SOLUBLE MISCELLANEOUS
Qty: 90 TABLET | Refills: 0 | Status: SHIPPED | OUTPATIENT
Start: 2023-01-22

## 2023-01-22 RX ORDER — SODIUM CHLORIDE 1000 MG
1 TABLET, SOLUBLE MISCELLANEOUS
Status: DISCONTINUED | OUTPATIENT
Start: 2023-01-22 | End: 2023-01-22 | Stop reason: HOSPADM

## 2023-01-22 RX ORDER — AMLODIPINE BESYLATE 10 MG/1
10 TABLET ORAL DAILY
Qty: 30 TABLET | Refills: 0 | Status: SHIPPED | OUTPATIENT
Start: 2023-01-23

## 2023-01-22 RX ORDER — MIRTAZAPINE 30 MG/1
30 TABLET, FILM COATED ORAL
Qty: 30 TABLET | Refills: 0 | Status: SHIPPED | OUTPATIENT
Start: 2023-01-22

## 2023-01-22 RX ADMIN — Medication 1 G: at 08:00

## 2023-01-22 RX ADMIN — DOCUSATE SODIUM 100 MG: 100 CAPSULE, LIQUID FILLED ORAL at 09:09

## 2023-01-22 RX ADMIN — METOPROLOL SUCCINATE 100 MG: 100 TABLET, EXTENDED RELEASE ORAL at 09:09

## 2023-01-22 RX ADMIN — AMLODIPINE BESYLATE 10 MG: 10 TABLET ORAL at 09:09

## 2023-01-22 RX ADMIN — ENOXAPARIN SODIUM 40 MG: 40 INJECTION SUBCUTANEOUS at 09:09

## 2023-01-22 NOTE — DISCHARGE SUMMARY
Shereejeffrey 45  Discharge- Hugo Davila 1960, 58 y o  female MRN: [id number]  Unit/Bed#: -01 Encounter: [id number]  Primary Care Provider: Farhad Castle MD   Date and time admitted to hospital: 1/18/2023  1:59 PM    * Hyponatremia  Assessment & Plan    · POA sodium was 122-123  · S/p IV hydration with normal saline   · Likely secondary to increased free fluid consumption of at least 1 to 2 gallon per day  · Overcorrection of sodium IV fluid was changed to D5 and was given a dose of DDAVP  · Patient was started on 1 g of sodium chloride tablets BID  Also started on hypertonic saline at 60 mL/h  · CT of the chest and abdomen shows no acute changes  · Continue FR  · Fluoxetine discontinued which could be contributing to hyponatremia and will switch to Remeron as per psychiatry input  · Recommend outpatient follow up with Psychiatry  · Nephrology consulted, appreciate recommendations:  · Recommend restarting salt tablets 1G TID  · Continue fluid restriction of 1500 mL on discharge  · May need to add low-dose torsemide based on results of repeat BMP outpatient  · OK to discharge from nephrology perspective  · Recommend outpatient follow up with Primary Nephrologist  · Recommend repeat BMP within 5-7 days of discharge    Stage 3a chronic kidney disease [facility])  Assessment & Plan  Lab Results   Component Value Date    EGFR 48 01/21/2023    EGFR 50 01/20/2023    EGFR 53 01/20/2023    CREATININE 1 20 01/21/2023    CREATININE 1 16 01/20/2023    CREATININE 1 10 01/20/2023     · Baseline Crt appears to be around 1 4-1 7  · Currently stable at baseline  · Recommend continued outpatient follow up with Nephrology upon discharge    Abnormal CT of the abdomen  Assessment & Plan  · CT A/P:  shows no acute changes however there is 1 4 cm hypodense lesion in the right ovary and needs pelvic ultrasound   · Ultrasound was ordered inpatient but patient refused further testing currently  · Recommend outpatient pelvic ultrasound to follow-up  · Recommend outpatient follow up with PCP for further evaluation    Benign essential hypertension  Assessment & Plan  · BP reviewed and currently stable  · Continue metoprolol and amlodipine  Hypercholesterolemia  Assessment & Plan  · Continue rosuvastatin upon discharge    Class 3 severe obesity due to excess calories without serious comorbidity with body mass index (BMI) of 40 0 to 44 9 in adult [facility])  Assessment & Plan  · Educated on lifestyle modifications including diet and exercise    Arthritis  Assessment & Plan  · Continue supportive care      Medical Problems     Resolved Problems  Date Reviewed: 1/22/2023   None       Discharging Physician / Practitioner: PAUL Briggs  PCP: Kandace Lee MD  Admission Date:   Admission Orders (From admission, onward)     Ordered        01/20/23 1250  Inpatient Admission  Once            01/18/23 1515  Place in Observation  Once                      Discharge Date: 01/22/23    Consultations During Hospital Stay:  · Nephrology    Procedures Performed:   · None    Significant Findings / Test Results:     CT chest abdomen pelvis wo contrast   Final Result by Kassy Blevins MD (01/19 2107)      No acute thoracic, abdominal, or pelvic pathology within limitation of a noncontrast study  Mosaic attenuation of the lung parenchyma, which may be seen in the setting of small vessel or small airways disease  Diffuse subpleural reticulation, which may be related to underlying interstitial lung disease  1 4 cm hypodense lesion in the right ovary  Recommend further evaluation with nonemergent pelvic ultrasound  Additional findings as above  Workstation performed: [id number]         XR chest 1 view portable   Final Result by Patrick Mcnamara MD (01/18 1643)      No acute cardiopulmonary disease                    Workstation performed: [id number]         US pelvis complete non OB    (Results Pending)       Incidental Findings:   · CT C/A/P:   · Mosaic attenuation of the lung parenchyma, which may be seen in the setting of small vessel or small airways disease  Diffuse subpleural reticulation, which may be related to underlying interstitial lung disease  · 1 4 cm hypodense lesion in the right ovary  Recommend further evaluation with nonemergent pelvic ultrasound  · I reviewed the above mentioned incidental findings with the patient and/or family and they expressed understanding  Test Results Pending at Discharge (will require follow up): · None     Outpatient Tests Requested:  · Recommend outpatient follow up with PCP  · Recommend outpatient follow up with Nephrology  · Recommend outpatient follow up with Psychiatry  · Recommend outpatient follow up with Pulmonology  · Recommend outpatient Pelvis Ultrasound  · Recommend repeat BMP within 5-7 days of discharge    Complications:  None    Reason for Admission: Abnormal Labs    Hospital Course:   Dnonie Escobedo is a 58 y o  female patient  with a PMH of and hypertension, anxiety, dyslipidemia, and depression who originally presented to the hospital on 1/18/2023 due to abnormal labs  Patient admits to have been drinking a lot of water at least 1 gallon more than a gallon for many years  Patient has always been told by her nephrologist Dr Deidre Aviles to cut down on the fluids  Patient states that she felt slightly weak and had blood work done yesterday and showed sodium of 123 and she was asked to come into the ED  Arrival to the ED, labs were remarkable for sodium of 122  Patient was started on IV normal saline in the ED  Nephrology was consulted  Sodium level overcorrected  Patient received a dose of DDAVP and was started on D5 infusion  Sodium level then dropped  Patient was started on sodium tabs and hypertonic saline  She was eventually weaned off IV fluids  Sodium level now back to baseline between 130-136    Recommend continuing fluid restriction upon discharge  Recommend restarting salt tablets 1 g 3 times daily  Recommend outpatient follow-up with primary nephrologist upon discharge  Recommend repeat BMP within 5 to 7 days of discharge  Of note, CT chest abdomen pelvis was completed on admission  CT abdomen revealed 1 4 cm hypodense lesion in the right ovary  Nonemergent pelvic ultrasound was recommended  Patient was ordered for inpatient ultrasound but refused  Educated on the importance of follow-up  Recommend outpatient follow-up with PCP for further management and pelvic ultrasound outpatient  Also noted on CT chest, patient with mosaic attenuation of the lung parenchyma the setting of small vessel or small airway disease which may be related to interstitial lung disease  Patient with no history of chronic lung disease in the past   Recommend outpatient follow-up with pulmonology for further evaluation and management  Discussed plan with patient at bedside, all questions were answered  Please see above list of diagnoses and related plan for additional information  Condition at Discharge: stable    Discharge Day Visit / Exam:   Subjective: Patient states she feels good  Patient states she never really actually felt bad  Patient states she is ready to go home  Patient denies any active chest pain, shortness of breath, abdominal pain, nausea, vomiting or diarrhea  Vitals: Blood Pressure: 146/73 (01/22/23 0717)  Pulse: 62 (01/22/23 0717)  Temperature: 98 4 °F (36 9 °C) (01/22/23 0717)  Temp Source: Tympanic (01/22/23 0717)  Respirations: 16 (01/22/23 0717)  Height: 5' 4" (162 6 cm) (01/18/23 1642)  Weight - Scale: 115 kg (254 lb 0 2 oz) (01/18/23 1642)  SpO2: 95 % (01/22/23 0717)  Exam:   Physical Exam  Vitals and nursing note reviewed  Constitutional:       General: She is not in acute distress  Appearance: She is obese  HENT:      Head: Normocephalic  Nose: Nose normal  No congestion  Mouth/Throat:      Mouth: Mucous membranes are moist       Pharynx: Oropharynx is clear  Cardiovascular:      Rate and Rhythm: Normal rate and regular rhythm  Pulses: Normal pulses  Heart sounds: No murmur heard  Pulmonary:      Effort: Pulmonary effort is normal  No respiratory distress  Breath sounds: Decreased breath sounds present  Abdominal:      General: Bowel sounds are normal  There is no distension  Palpations: Abdomen is soft  Tenderness: There is no abdominal tenderness  Musculoskeletal:         General: Normal range of motion  Cervical back: Normal range of motion  Right lower leg: No edema  Left lower leg: No edema  Skin:     General: Skin is warm and dry  Capillary Refill: Capillary refill takes less than 2 seconds  Neurological:      Mental Status: She is alert and oriented to person, place, and time  Mental status is at baseline  Motor: No weakness  Psychiatric:         Mood and Affect: Mood normal          Speech: Speech normal          Behavior: Behavior is cooperative  Discussion with Family: Patient declined call to   Discharge instructions/Information to patient and family:   See after visit summary for information provided to patient and family  Provisions for Follow-Up Care:  See after visit summary for information related to follow-up care and any pertinent home health orders  Disposition:   Home    Planned Readmission: No     Discharge Statement:  I spent 60 minutes discharging the patient  This time was spent on the day of discharge  I had direct contact with the patient on the day of discharge  Greater than 50% of the total time was spent examining patient, answering all patient questions, arranging and discussing plan of care with patient as well as directly providing post-discharge instructions  Additional time then spent on discharge activities      Discharge Medications:  See after visit summary for reconciled discharge medications provided to patient and/or family        **Please Note: This note may have been constructed using a voice recognition system**

## 2023-01-22 NOTE — PLAN OF CARE
Problem: METABOLIC, FLUID AND ELECTROLYTES - ADULT  Goal: Electrolytes maintained within normal limits  Description: INTERVENTIONS:  - Monitor labs and assess patient for signs and symptoms of electrolyte imbalances  - Administer electrolyte replacement as ordered  - Monitor response to electrolyte replacements, including repeat lab results as appropriate  - Instruct patient on fluid and nutrition as appropriate  Outcome: Progressing     Problem: PAIN - ADULT  Goal: Verbalizes/displays adequate comfort level or baseline comfort level  Description: Interventions:  - Encourage patient to monitor pain and request assistance  - Assess pain using appropriate pain scale  - Administer analgesics based on type and severity of pain and evaluate response  - Implement non-pharmacological measures as appropriate and evaluate response  - Consider cultural and social influences on pain and pain management  - Notify physician/advanced practitioner if interventions unsuccessful or patient reports new pain  Outcome: Progressing     Problem: INFECTION - ADULT  Goal: Absence or prevention of progression during hospitalization  Description: INTERVENTIONS:  - Assess and monitor for signs and symptoms of infection  - Monitor lab/diagnostic results  - Monitor all insertion sites, i e  indwelling lines, tubes, and drains  - Monitor endotracheal if appropriate and nasal secretions for changes in amount and color  - [place] appropriate cooling/warming therapies per order  - Administer medications as ordered  - Instruct and encourage patient and family to use good hand hygiene technique  - Identify and instruct in appropriate isolation precautions for identified infection/condition  Outcome: Progressing  Goal: Absence of fever/infection during neutropenic period  Description: INTERVENTIONS:  - Monitor WBC    Outcome: Progressing     Problem: SAFETY ADULT  Goal: Patient will remain free of falls  Description: INTERVENTIONS:  - Educate patient/family on patient safety including physical limitations  - Instruct patient to call for assistance with activity   - Consult OT/PT to assist with strengthening/mobility   - Keep Call bell within reach  - Keep bed low and locked with side rails adjusted as appropriate  - Keep care items and personal belongings within reach  - Initiate and maintain comfort rounds  - Make Fall Risk Sign visible to staff  - Apply yellow socks and bracelet for high fall risk patients  - Consider moving patient to room near nurses station  Outcome: Progressing  Goal: Maintain or return to baseline ADL function  Description: INTERVENTIONS:  -  Assess patient's ability to carry out ADLs; assess patient's baseline for ADL function and identify physical deficits which impact ability to perform ADLs (bathing, care of mouth/teeth, toileting, grooming, dressing, etc )  - Assess/evaluate cause of self-care deficits   - Assess range of motion  - Assess patient's mobility; develop plan if impaired  - Assess patient's need for assistive devices and provide as appropriate  - Encourage maximum independence but intervene and supervise when necessary  - Involve family in performance of ADLs  - Assess for home care needs following discharge   - Consider OT consult to assist with ADL evaluation and planning for discharge  - Provide patient education as appropriate  Outcome: Progressing  Goal: Maintains/Returns to pre admission functional level  Description: INTERVENTIONS:  - Perform BMAT or MOVE assessment daily    - Set and communicate daily mobility goal to care team and patient/family/caregiver     - Collaborate with rehabilitation services on mobility goals if consulted  - Out of bed for toileting  - Record patient progress and toleration of activity level   Outcome: Progressing     Problem: DISCHARGE PLANNING  Goal: Discharge to home or other facility with appropriate resources  Description: INTERVENTIONS:  - Identify barriers to discharge w/patient and caregiver  - Arrange for needed discharge resources and transportation as appropriate  - Identify discharge learning needs (meds, wound care, etc )  - Arrange for interpretive services to assist at discharge as needed  - Refer to Case Management Department for coordinating discharge planning if the patient needs post-hospital services based on physician/advanced practitioner order or complex needs related to functional status, cognitive ability, or social support system  Outcome: Progressing     Problem: Knowledge Deficit  Goal: Patient/family/caregiver demonstrates understanding of disease process, treatment plan, medications, and discharge instructions  Description: Complete learning assessment and assess knowledge base  Interventions:  - Provide teaching at level of understanding  - Provide teaching via preferred learning methods  Outcome: Progressing     Problem: Nutrition/Hydration-ADULT  Goal: Nutrient/Hydration intake appropriate for improving, restoring or maintaining nutritional needs  Description: Monitor and assess patient's nutrition/hydration status for malnutrition  Collaborate with interdisciplinary team and initiate plan and interventions as ordered  Monitor patient's weight and dietary intake as ordered or per policy  Utilize nutrition screening tool and intervene as necessary  Determine patient's food preferences and provide high-protein, high-caloric foods as appropriate       INTERVENTIONS:  - Monitor oral intake, urinary output, labs, and treatment plans  - Assess nutrition and hydration status and recommend course of action  - Evaluate amount of meals eaten  - Assist patient with eating if necessary   - Allow adequate time for meals  - Recommend/ encourage appropriate diets, oral nutritional supplements, and vitamin/mineral supplements  - Order, calculate, and assess calorie counts as needed  - Assess need for intravenous fluids  - Provide specific nutrition/hydration education as appropriate  - Include patient/family/caregiver in decisions related to nutrition  Outcome: Progressing

## 2023-01-22 NOTE — ASSESSMENT & PLAN NOTE
· CT A/P:  shows no acute changes however there is 1 4 cm hypodense lesion in the right ovary and needs pelvic ultrasound   · Ultrasound was ordered inpatient but patient refused further testing currently      · Recommend outpatient pelvic ultrasound to follow-up  · Recommend outpatient follow up with PCP for further evaluation

## 2023-01-22 NOTE — PLAN OF CARE
Problem: METABOLIC, FLUID AND ELECTROLYTES - ADULT  Goal: Electrolytes maintained within normal limits  Description: INTERVENTIONS:  - Monitor labs and assess patient for signs and symptoms of electrolyte imbalances  - Administer electrolyte replacement as ordered  - Monitor response to electrolyte replacements, including repeat lab results as appropriate  - Instruct patient on fluid and nutrition as appropriate  Outcome: Progressing     Problem: Nutrition/Hydration-ADULT  Goal: Nutrient/Hydration intake appropriate for improving, restoring or maintaining nutritional needs  Description: Monitor and assess patient's nutrition/hydration status for malnutrition  Collaborate with interdisciplinary team and initiate plan and interventions as ordered  Monitor patient's weight and dietary intake as ordered or per policy  Utilize nutrition screening tool and intervene as necessary  Determine patient's food preferences and provide high-protein, high-caloric foods as appropriate       INTERVENTIONS:  - Monitor oral intake, urinary output, labs, and treatment plans  - Assess nutrition and hydration status and recommend course of action  - Evaluate amount of meals eaten  - Assist patient with eating if necessary   - Allow adequate time for meals  - Recommend/ encourage appropriate diets, oral nutritional supplements, and vitamin/mineral supplements  - Order, calculate, and assess calorie counts as needed  - Recommend, monitor, and adjust tube feedings and TPN/PPN based on assessed needs  - Assess need for intravenous fluids  - Provide specific nutrition/hydration education as appropriate  - Include patient/family/caregiver in decisions related to nutrition  Outcome: Progressing

## 2023-01-22 NOTE — PROGRESS NOTES
Eliazar Srivastava PROGRESS NOTE   Mamaineabdiaziz Margareth Davila 58 y o  female MRN: [id number]  Unit/Bed#: -01 Encounter: [id number]  Reason for Consult: Hyponatremia    ASSESSMENT and PLAN:  80-year-old female with history of anxiety, depression, CKD stage III, dyslipidemia, hypertension who presented with hyponatremia  We are consulted for management of hyponatremia  # Disposition  - Okay to discharge from nephrology perspective  - We will arrange BMP follow-up in nephrology office    # Hyponatremia  - Baseline serum sodium appears to be around 130-136 since 2020  - Serum sodium on admission 122 01/18/2023  - Serum sodium 131 on 01/19/2023 suggestive of overcorrection status post DDAVP and D5W  - Serum sodium 124 on 01/20/2023 status post hypertonic saline  - Serum sodium 132 on 01/21/2023 status post fluid restriction  - Serum sodium 131 on 01/22/2023  - Serum osmolality of 262 consistent with hypotonic hyponatremia  - Urine osmolality of 186 and urine sodium of 9  - TSH within normal limits rules out hypothyroidism  - A m  cortisol 30 8 rules out hypocortisolism  - Uric acid 4 5 within normal limits not consistent with SIADH  >>Etiology of hyponatremia is likely multifactorial: Most likely etiology is polydipsia +/- volume depletion    PLAN:  - Start salt tablets 1 g 3 times daily  - Continue fluid restriction at 1500 cc per 24 hours  - May need to add low-dose torsemide based on results of repeat BMP as outpatient  - Okay to discharge from nephrology perspective    # CKD stage IIIb  - Outpatient nephrologist Dr Wilda Pandey  - Most likely hypertensive nephrosclerosis as an etiology  - Baseline creatinine: 1 4-1 7  - Serum creatinine currently at baseline    # Hypertension  - Currently on amlodipine 10 mg daily and Toprol- mg daily    SUBJECTIVE / 24H INTERVAL HISTORY:  Urine output recorded as 2100 cc  Currently dressed and ready to go home  Denies new symptoms      Review of Systems   Constitutional: Negative for chills and fever  HENT: Negative for ear pain and sore throat  Eyes: Negative for pain and visual disturbance  Respiratory: Negative for cough and shortness of breath  Cardiovascular: Negative for chest pain and palpitations  Gastrointestinal: Negative for abdominal pain and vomiting  Genitourinary: Negative for dysuria and hematuria  Musculoskeletal: Negative for arthralgias and back pain  Skin: Negative for color change and rash  Neurological: Negative for seizures and syncope  All other systems reviewed and are negative  OBJECTIVE:  Current Weight: Weight - Scale: 115 kg (254 lb 0 2 oz)  Vitals:    01/20/23 2300 01/21/23 0801 01/21/23 1614 01/21/23 2330   BP: 153/79 145/82 145/78 130/58   BP Location: Left arm Right arm Left arm Right arm   Pulse: 64 70 62 59   Resp: 16 20 18 16   Temp: (!) 97 °F (36 1 °C) 98 8 °F (37 1 °C) 98 2 °F (36 8 °C) 98 5 °F (36 9 °C)   TempSrc: Tympanic Oral Oral Tympanic   SpO2: 95% 97% 97% 96%   Weight:       Height:           Intake/Output Summary (Last 24 hours) at 1/22/2023 0643  Last data filed at 1/22/2023 0444  Gross per 24 hour   Intake 1323 ml   Output 2500 ml   Net -1177 ml     Physical Exam  Vitals and nursing note reviewed  Constitutional:       General: She is not in acute distress  Appearance: She is well-developed  She is obese  HENT:      Head: Normocephalic and atraumatic  Eyes:      Conjunctiva/sclera: Conjunctivae normal    Cardiovascular:      Rate and Rhythm: Normal rate and regular rhythm  Pulses: Normal pulses  Heart sounds: Normal heart sounds  No murmur heard  Pulmonary:      Effort: Pulmonary effort is normal  No respiratory distress  Breath sounds: Normal breath sounds  Abdominal:      Palpations: Abdomen is soft  Tenderness: There is no abdominal tenderness  Musculoskeletal:         General: No swelling  Cervical back: Neck supple  Right lower leg: No edema        Left lower leg: No edema    Skin:     General: Skin is warm and dry  Capillary Refill: Capillary refill takes less than 2 seconds  Neurological:      Mental Status: She is alert     Psychiatric:         Mood and Affect: Mood normal          Medications:    Current Facility-Administered Medications:   •  acetaminophen (TYLENOL) tablet 650 mg, 650 mg, Oral, Q6H PRN, Con Dixon MD  •  ALPRAZolam Ruiz ) tablet 1 mg, 1 mg, Oral, TID PRN, Con Dixon MD, 1 mg at 01/21/23 2042  •  amLODIPine (NORVASC) tablet 10 mg, 10 mg, Oral, Daily, Cruzito Villasenor MD, 10 mg at 01/21/23 1000  •  benztropine (COGENTIN) tablet 0 5 mg, 0 5 mg, Oral, BID PRN, Con Dixon MD, 0 5 mg at 01/21/23 2043  •  docusate sodium (COLACE) capsule 100 mg, 100 mg, Oral, BID, Con Dixon MD, 100 mg at 01/21/23 1721  •  enoxaparin (LOVENOX) subcutaneous injection 40 mg, 40 mg, Subcutaneous, Q12H [facility], Con Dixon MD, 40 mg at 01/21/23 2043  •  metoprolol succinate (TOPROL-XL) 24 hr tablet 100 mg, 100 mg, Oral, Daily, Con Dixon MD, 100 mg at 01/21/23 1000  •  mirtazapine (REMERON) tablet 30 mg, 30 mg, Oral, HS, Vee Urban MD  •  OLANZapine (ZyPREXA) tablet 7 5 mg, 7 5 mg, Oral, HS, Vee Urban MD, 7 5 mg at 01/21/23 2102  •  ondansetron (ZOFRAN) injection 4 mg, 4 mg, Intravenous, Q6H PRN, Con Dixon MD, 4 mg at 01/20/23 0434  •  pravastatin (PRAVACHOL) tablet 40 mg, 40 mg, Oral, Daily With Dinner, Con Dixon MD, 40 mg at 01/21/23 1721  •  sodium chloride tablet 1 g, 1 g, Oral, TID With Meals, Ted Bhatt MD    Laboratory Results:  Results from last 7 days   Lab Units 01/22/23  0440 01/21/23  0513 01/20/23  2237 01/20/23  1517 01/20/23  0844 01/20/23  0427 01/20/23  0157 01/19/23  1220 01/19/23  0530 01/19/23  0018 01/18/23  1942 01/18/23  1413   WBC Thousand/uL 7 70  --   --   --   --  7 40  --   -- 7 00  --   --  9 53   HEMOGLOBIN g/dL 11 5  --   --   --   --  11 5  --   --  12 1  --   --  13 0   HEMATOCRIT % 37 0  --   --   --   --  34 0*  --   --  36 3  --   --  37 6   PLATELETS Thousands/uL 237  --   --   --   --  332  --   --  367  --  341 414*   POTASSIUM mmol/L 4 5 4 5 4 1 4 1 4 6 4 0 3 8   < > 4 1   < > 3 5 4 2   CHLORIDE mmol/L 97 97 95* 90* 89* 91* 93*   < > 97   < > 92* 89*   CO2 mmol/L 27 27 24 23 23 23 23   < > 23   < > 21 21   BUN mg/dL 17 10 12 12 9 10 12   < > 15   < > 19 18   CREATININE mg/dL 1 22 1 20 1 16 1 10 1 14 1 19 1 17   < > 1 53*   < > 1 75* 1 76*   CALCIUM mg/dL 9 3 9 5 8 9 9 1 8 9 9 0 8 5   < > 9 5   < > 9 0 9 9   MAGNESIUM mg/dL  --   --   --   --   --  1 5*  --   --   --   --   --  1 6*   PHOSPHORUS mg/dL  --   --   --   --   --   --   --   --   --   --   --  3 6    < > = values in this interval not displayed  Portions of the record may have been created with voice recognition software  Occasional wrong word or "sound a like" substitutions may have occurred due to the inherent limitations of voice recognition software  Read the chart carefully and recognize, using context, where substitutions have occurred  If you have any questions, please contact the dictating provider

## 2023-01-22 NOTE — NURSING NOTE
Reviewed AVS with patient, educated patient to follow up with PCP/GYN and US pelvis  Pt verbalized understanding  IV removed  Patient transported via [facility] to main entrance where  lyft services will transport home

## 2023-01-23 ENCOUNTER — TELEPHONE (OUTPATIENT)
Dept: NEPHROLOGY | Facility: CLINIC | Age: 63
End: 2023-01-23

## 2023-01-23 ENCOUNTER — TELEPHONE (OUTPATIENT)
Dept: PSYCHIATRY | Facility: CLINIC | Age: 63
End: 2023-01-23

## 2023-01-23 NOTE — TELEPHONE ENCOUNTER
Called dr Verner Pitman [facility]'s office to have them reach out to Rekha to schedule a hospital follow-up

## 2023-01-23 NOTE — UTILIZATION REVIEW
NOTIFICATION OF ADMISSION DISCHARGE   This is a Notification of Discharge from [address]  Please be advised that this patient has been discharge from our facility  Below you will find the admission and discharge date and time including the patient’s disposition  UTILIZATION REVIEW CONTACT:  [address] [place]  Utilization   Network Utilization Review Department  Phone: [phone] x carefully listen to the prompts  All voicemails are confidential   Email: [email or web address]     ADMISSION INFORMATION  PRESENTATION DATE: 1/18/2023  1:59 PM  OBERVATION ADMISSION DATE:   INPATIENT ADMISSION DATE: 1/20/23 12:50 PM   DISCHARGE DATE: 1/22/2023  1:08 PM   DISPOSITION:Home/Self Care    IMPORTANT INFORMATION:  Send all requests for admission clinical reviews, approved or denied determinations and any other requests to dedicated fax number below belonging to the campus where the patient is receiving treatment   List of dedicated fax numbers:  [address] DENIALS (Administrative/Medical Necessity) [phone]   [address] (Maternity/NICU/Pediatrics) [phone]   [facility] [phone]   [facility] [phone]   [address] [phone]   [address] [facility] [phone]   90 [facility] [phone]   [facility] 119 [phone]   [facility]  [phone]   40[address] [facility] [phone] 412 [facility] [address] [facility] [phone]

## 2023-01-27 ENCOUNTER — CONSULT (OUTPATIENT)
Dept: PULMONOLOGY | Facility: CLINIC | Age: 63
End: 2023-01-27

## 2023-01-27 VITALS
SYSTOLIC BLOOD PRESSURE: 128 MMHG | DIASTOLIC BLOOD PRESSURE: 76 MMHG | BODY MASS INDEX: 42 KG/M2 | TEMPERATURE: 97.6 F | OXYGEN SATURATION: 98 % | HEIGHT: 64 IN | HEART RATE: 63 BPM | WEIGHT: 246 LBS

## 2023-01-27 DIAGNOSIS — N83.9 LESION OF RIGHT OVARY: ICD-10-CM

## 2023-01-27 DIAGNOSIS — J84.9 ILD (INTERSTITIAL LUNG DISEASE) (HCC): Primary | ICD-10-CM

## 2023-01-27 DIAGNOSIS — Z87.891 HISTORY OF SMOKING: ICD-10-CM

## 2023-01-27 DIAGNOSIS — R93.89 ABNORMAL CT OF THE CHEST: ICD-10-CM

## 2023-01-27 DIAGNOSIS — E66.01 CLASS 3 SEVERE OBESITY DUE TO EXCESS CALORIES WITHOUT SERIOUS COMORBIDITY WITH BODY MASS INDEX (BMI) OF 40.0 TO 44.9 IN ADULT (HCC): ICD-10-CM

## 2023-01-27 PROBLEM — F17.200 TOBACCO DEPENDENCE: Status: ACTIVE | Noted: 2023-01-27

## 2023-01-27 RX ORDER — LOSARTAN POTASSIUM 50 MG/1
1 TABLET ORAL DAILY
COMMUNITY

## 2023-01-27 RX ORDER — CHOLECALCIFEROL (VITAMIN D3) 125 MCG
2000 CAPSULE ORAL DAILY
COMMUNITY
Start: 2023-01-22

## 2023-01-27 RX ORDER — BENZTROPINE MESYLATE 0.5 MG/1
TABLET ORAL
COMMUNITY
Start: 2023-01-09

## 2023-01-27 RX ORDER — FERROUS SULFATE 325(65) MG
TABLET ORAL
COMMUNITY
Start: 2023-01-24

## 2023-01-27 RX ORDER — ROSUVASTATIN CALCIUM 5 MG/1
5 TABLET, COATED ORAL DAILY
COMMUNITY
Start: 2022-12-13 | End: 2023-12-13

## 2023-01-27 RX ORDER — SENNOSIDES 8.6 MG
650 CAPSULE ORAL EVERY 8 HOURS PRN
COMMUNITY

## 2023-01-27 RX ORDER — OXYCODONE HYDROCHLORIDE AND ACETAMINOPHEN 5; 325 MG/1; MG/1
1 TABLET ORAL EVERY 8 HOURS PRN
COMMUNITY
Start: 2022-12-30

## 2023-01-27 NOTE — ASSESSMENT & PLAN NOTE
She was a smoker for 22 years, quit about 20 years back  She smoked up to 3 packs a day starting at the age of 15  Currently she is very motivated to remain quit

## 2023-01-27 NOTE — ASSESSMENT & PLAN NOTE
Ms Rekha Arambula was recently admitted to [facility] with hyponatremia  Her CT scan of the chest from 1/19/2023 showed a mosaic pattern with diffuse subpleural reticulation, suggestive of  interstitial lung disease  On clinical examination she had occasional inspiratory crackles at both lung bases  She likely has ILD  I have ordered a connective tissue work-up as well as a full PFT  I had a long discussion with her and have answered all her questions

## 2023-01-27 NOTE — ASSESSMENT & PLAN NOTE
Her CT scan of the abdomen showed a 1 4 cm lesion in the right ovary  This will need to be further evaluated and I have referred her to gynecology

## 2023-01-27 NOTE — PROGRESS NOTES
Assessment/Plan:    ILD [facility]interstitial lung disease) ([facility] )  Ms Howard Sequeira was recently admitted to [facility] with hyponatremia  Her CT scan of the chest from 1/19/2023 showed a mosaic pattern with diffuse subpleural reticulation, suggestive of  interstitial lung disease  On clinical examination she had occasional inspiratory crackles at both lung bases  She likely has ILD  I have ordered a connective tissue work-up as well as a full PFT  I had a long discussion with her and have answered all her questions  Abnormal CT of the abdomen    Her CT scan of the chest showed mostly clear attenuation of the parenchyma with diffuse subpleural calcification likely related to underlying interstitial lung disease  She also had a 1 4 cm hypodense lesion in the right ovary  History of smoking  She was a smoker for 22 years, quit about 20 years back  She smoked up to 3 packs a day starting at the age of 15  Currently she is very motivated to remain quit  Lesion of right ovary  Her CT scan of the abdomen showed a 1 4 cm lesion in the right ovary  This will need to be further evaluated and I have referred her to gynecology  Class 3 severe obesity due to excess calories without serious comorbidity with body mass index (BMI) of 40 0 to 44 9 in [facility])  She is morbidly obese and needs to lose weight  Have counseled her  Benign essential hypertension  She has a history of hypertension and currently she is on treatment with metoprolol and losartan       Diagnoses and all orders for this visit:    ILD (interstitial lung disease) ([facility] )  -     Complete PFT with post Bronchodilator and Six Minute walk; Future  -     Rheumatoid Arthritis Factor; Future  -     Anti-neutrophilic cytoplasmic antibody; Future  -     CORNELL Screen w/ Reflex to Titer/Pattern; Future  -     Sedimentation rate, automated; Future  -     Angiotensin converting enzyme;  Future    Abnormal CT of the chest  -     Ambulatory referral to Pulmonology    History of smoking    Class 3 severe obesity due to excess calories without serious comorbidity with body mass index (BMI) of 40 0 to 44 9 in adult [facility])    Lesion of right ovary  -     Ambulatory Referral to Gynecology; Future    Other orders  -     rosuvastatin (CRESTOR) 5 mg tablet; Take 5 mg by mouth daily  -     oxyCODONE-acetaminophen (PERCOCET) 5-325 mg per tablet; Take 1 tablet by mouth every 8 (eight) hours as needed  -     mupirocin (BACTROBAN) 2 % ointment; Apply topically 2 (two) times a day  -     losartan (COZAAR) 50 mg tablet; Take 1 tablet by mouth daily  -     ferrous sulfate 325 (65 Fe) mg tablet;  (Patient not taking: Reported on 1/27/2023)  -     Cholecalciferol (Vitamin D3) 50 MCG (2000 UT) TABS; Take 2,000 Units by mouth daily  -     benztropine (COGENTIN) 0 5 mg tablet; TAKE 1 TABLET BY MOUTH DAILY AS NEEDED FOR CRAMPING OR TREMOR  -     acetaminophen (TYLENOL) 650 mg CR tablet; Take 650 mg by mouth every 8 (eight) hours as needed          Subjective:      Patient ID: Kassy Orr is a 58 y o  female  Ms  Author Harada has past medical history of hypertension, schizophrenia, hypercholesterolemia and obesity and was recently admitted to [facility] with hyponatremia  This is thought to be secondary to excessive fluid consumption  Currently she is on fluid restriction  She denies any headache or dizziness or lightheadedness  Her CT scan of the chest done at that time showed mosaic attenuation with diffuse subpleural calcification bilaterally  The possibility of interstitial lung disease has been raised  She stated that she has shortness of breath on exertion and her exercise tolerance is at least 2 blocks  She has however difficulty climbing 1 flight of stairs  She denied any significant cough or phlegm  She denied any wheezing  She denied any chest pain no palpitations  She denied any swelling of feet  No fever or chills  She has a history of arthritis    She was a smoker but quit about 20 years back  She smoked up to 3 packs a day for about 22 years  She does not use any oxygen or inhaler  She is morbidly obese and understands the need for weight reduction  She has history of snoring and daytime sleepiness and tiredness  She denied any episodes waking up during sleep gasping for air  She denied any reported apneas  She has history of hypertension and has been on treatment with losartan and metoprolol  She denied any difficulty swallowing  She was incidentally found to have a 1 4 cm hypodense lesion in the right ovary  A further evaluation with ultrasound was advised  The following portions of the patient's history were reviewed and updated as appropriate: allergies, current medications, past family history, past medical history, past social history, past surgical history and problem list     Review of Systems   Constitutional: Positive for fatigue  Negative for appetite change, chills, fever and unexpected weight change  HENT: Positive for postnasal drip, rhinorrhea and sneezing  Negative for hearing loss, sore throat and trouble swallowing  Eyes: Negative for visual disturbance  Respiratory: Positive for shortness of breath (ET 2 blocks)  Negative for cough, chest tightness and wheezing  Cardiovascular: Negative for chest pain, palpitations and leg swelling  Gastrointestinal: Positive for constipation  Negative for abdominal pain, diarrhea, nausea and vomiting  Genitourinary: Positive for frequency  Negative for dysuria and urgency  Musculoskeletal: Positive for arthralgias  Negative for back pain and gait problem  Skin: Negative for rash  Allergic/Immunologic: Positive for environmental allergies  Neurological: Negative for dizziness, syncope, light-headedness and headaches  Psychiatric/Behavioral: Positive for sleep disturbance  Negative for agitation and confusion  The patient is not nervous/anxious  Objective:      /76   Pulse 63   Temp 97 6 °F (36 4 °C)   Ht 5' 4" (1 626 m)   Wt 112 kg (246 lb)   SpO2 98%   BMI 42 23 kg/m²          Physical Exam  Vitals reviewed  Constitutional:       General: She is not in acute distress  Appearance: She is obese  She is not ill-appearing, toxic-appearing or diaphoretic  HENT:      Head: Normocephalic  Mouth/Throat:      Mouth: Mucous membranes are moist       Pharynx: Oropharynx is clear  Comments: [place] Class 3  Eyes:      General: No scleral icterus  Conjunctiva/sclera: Conjunctivae normal    Cardiovascular:      Rate and Rhythm: Normal rate and regular rhythm  Heart sounds: Normal heart sounds  No murmur heard  Pulmonary:      Effort: Pulmonary effort is normal  No respiratory distress  Breath sounds: No stridor  Rales (occasional basal inspiratory crackles) present  No wheezing or rhonchi  Chest:      Chest wall: No tenderness  Abdominal:      General: Bowel sounds are normal       Palpations: Abdomen is soft  Tenderness: There is no abdominal tenderness  There is no guarding  Musculoskeletal:      Cervical back: No rigidity  Right lower leg: No edema  Left lower leg: No edema  Lymphadenopathy:      Cervical: No cervical adenopathy  Skin:     Coloration: Skin is not jaundiced or pale  Findings: No rash  Neurological:      Mental Status: She is alert and oriented to person, place, and time  Gait: Gait normal    Psychiatric:         Mood and Affect: Mood normal          Behavior: Behavior normal          Thought Content:  Thought content normal          Judgment: Judgment normal

## 2023-01-27 NOTE — ASSESSMENT & PLAN NOTE
Her CT scan of the chest showed mostly clear attenuation of the parenchyma with diffuse subpleural calcification likely related to underlying interstitial lung disease  She also had a 1 4 cm hypodense lesion in the right ovary

## 2023-01-31 ENCOUNTER — OFFICE VISIT (OUTPATIENT)
Dept: OBGYN CLINIC | Facility: CLINIC | Age: 63
End: 2023-01-31

## 2023-01-31 VITALS
WEIGHT: 248 LBS | SYSTOLIC BLOOD PRESSURE: 126 MMHG | HEIGHT: 64 IN | BODY MASS INDEX: 42.34 KG/M2 | DIASTOLIC BLOOD PRESSURE: 82 MMHG

## 2023-01-31 DIAGNOSIS — N83.9 LESION OF RIGHT OVARY: Primary | ICD-10-CM

## 2023-01-31 DIAGNOSIS — Z87.410 HISTORY OF CERVICAL DYSPLASIA: ICD-10-CM

## 2023-01-31 NOTE — PROGRESS NOTES
Assessment/Plan    Diagnoses and all orders for this visit:    Lesion of right ovary  -Pelvic ultrasound to better evaluate for ovarian lesion   -Return to the office to discuss results following u/s  Hx of cervical dysplasia  -[facility]clear [facility]  -Recommend pap smear  Encouraged to return for [facility]  -Records from surgery and path  Subjective     Rekha CORNELIA Cerda is a 58 y o  female here for a problem visit  Patient referred for incidental finding on CT scan  States she had a recent CT scan completed due to difficulty with breathing  Pt  Reports hx of EULALIO at [facility] for "pre-cancerous" cells of the cervix  Has not followed up with gyn following procedure  CT scan 01/19/2023  FINDINGS:     Evaluation of visceral structures and vasculature is limited by lack of intravenous contrast      CHEST     BRONCHOPULMONARY:  Clear central airways  Mild diffuse subpleural reticulation is seen  There is mosaic attenuation of the lung parenchyma      There is a calcified granuloma in the right lung      PLEURA:  No pleural effusions  No pneumothorax      HEART/GREAT VESSELS: The heart is normal in size  No pericardial effusion  Normal caliber thoracic aorta      MEDIASTINUM/LYMPH NODES:  No axillary or mediastinal lymphadenopathy by size criteria  A few nonspecific subcentimeter mediastinal lymph nodes are seen  Evaluation for hilar lymphadenopathy is limited without IV contrast, however no grossly enlarged   lymph nodes are seen      CHEST WALL AND LOWER NECK:  Unremarkable      ABDOMEN     LIVER/BILIARY TREE:  Unremarkable unenhanced appearance of the liver  No biliary ductal dilation      GALLBLADDER:  No calcified gallstones  No pericholecystic inflammatory change      SPLEEN: Unremarkable unenhanced appearance      PANCREAS:  Unremarkable unenhanced appearance   No dilation of the main pancreatic duct      ADRENAL GLANDS:  Unremarkable unenhanced appearance on the right adrenal gland   There is a 1 8 cm hypodense nodule in the left adrenal gland which measures less than 10 Hounsfield units in attenuation, compatible with an adrenal adenoma      KIDNEYS/URETERS:  No intrarenal or ureteral calculi  No hydronephrosis  No definite focal renal lesions      STOMACH AND BOWEL:  Evaluation of the gastrointestinal tract is somewhat limited by underdistention and lack of oral contrast  No bowel obstruction or convincing inflammation      APPENDIX:  The appendix is not visualized, however there are no secondary findings of appendicitis      ABDOMINOPELVIC CAVITY:  No ascites or pneumoperitoneum       LYMPH NODES: No abdominal or pelvis lymphadenopathy      VESSELS: Normal caliber aorta      PELVIS     REPRODUCTIVE ORGANS:  The patient is status post hysterectomy  There is a 1 4 cm hypodense lesion in the right ovary      URINARY BLADDER:  Unremarkable      ABDOMINAL WALL/INGUINAL REGIONS: Small fat-containing umbilical hernia       MUSCULOSKELETAL:  No focal aggressive osseous lesions  Degenerative changes of the spine      IMPRESSION:     No acute thoracic, abdominal, or pelvic pathology within limitation of a noncontrast study      Mosaic attenuation of the lung parenchyma, which may be seen in the setting of small vessel or small airways disease      Diffuse subpleural reticulation, which may be related to underlying interstitial lung disease      1 4 cm hypodense lesion in the right ovary  Recommend further evaluation with nonemergent pelvic ultrasound      Additional findings as above      Patient Active Problem List   Diagnosis   • Arthritis   • Benign essential hypertension   • Class 3 severe obesity due to excess calories without serious comorbidity with body mass index (BMI) of 40 0 to 44 9 in [facility])   • Hypercholesterolemia   • Schizophrenia ([address] )   • Stage 3a chronic kidney disease ([address] )   • Hyponatremia   • Abnormal CT of the abdomen   • ILD (interstitial lung disease) ([address] )   • History of smoking   • Lesion of right ovary         Gynecologic History  No LMP recorded  Patient has had a hysterectomy  Contraception: post menopausal status  Last Pap: unsure      Obstetric History  OB History   No obstetric history on file  Past Medical History:   Diagnosis Date   • Depression    • High cholesterol    • Hypertension    • Psychiatric disorder    • Renal disorder        Past Surgical History:   Procedure Laterality Date   • HYSTERECTOMY           History reviewed  No pertinent family history  Social History     Socioeconomic History   • Marital status: Single     Spouse name: Not on file   • Number of children: Not on file   • Years of education: Not on file   • Highest education level: Not on file   Occupational History   • Not on file   Tobacco Use   • Smoking status: Former     Packs/day: 3 00     Types: Cigarettes     Quit date: 36     Years since quittin 1   • Smokeless tobacco: Never   Vaping Use   • Vaping Use: Never used   Substance and Sexual Activity   • Alcohol use: Never   • Drug use: Never   • Sexual activity: Not on file   Other Topics Concern   • Not on file   Social History Narrative   • Not on file     Social Determinants of Health     Financial Resource Strain: Not on file   Food Insecurity: No Food Insecurity   • Worried About Running Out of Food in the Last Year: Never true   • Ran Out of Food in the Last Year: Never true   Transportation Needs: No Transportation Needs   • Lack of Transportation (Medical): No   • Lack of Transportation (Non-Medical):  No   Physical Activity: Not on file   Stress: Not on file   Social Connections: Not on file   Intimate Partner Violence: Not on file   Housing Stability: Unknown   • Unable to Pay for Housing in the Last Year: No   • Number of Places Lived in the Last Year: Not on file   • Unstable Housing in the Last Year: No       Allergies  Benadryl [diphenhydramine], Esomeprazole, Hydrocodone-acetaminophen, Meperidine, and Nuts - food allergy    Medications    Current Outpatient Medications:   •  acetaminophen (TYLENOL) 650 mg CR tablet, Take 650 mg by mouth every 8 (eight) hours as needed, Disp: , Rfl:   •  ALPRAZolam (XANAX) 1 mg tablet, Take 1 mg by mouth 3 (three) times a day as needed for anxiety, Disp: , Rfl:   •  amLODIPine (NORVASC) 10 mg tablet, Take 1 tablet (10 mg total) by mouth daily Do not start before January 23, 2023 , Disp: 30 tablet, Rfl: 0  •  benztropine (COGENTIN) 0 5 mg tablet, TAKE 1 TABLET BY MOUTH DAILY AS NEEDED FOR CRAMPING OR TREMOR, Disp: , Rfl:   •  cholecalciferol (VITAMIN D3) 1,000 units tablet, Take 2,000 Units by mouth daily, Disp: , Rfl:   •  Cholecalciferol (Vitamin D3) 50 MCG (2000 UT) TABS, Take 2,000 Units by mouth daily, Disp: , Rfl:   •  losartan (COZAAR) 50 mg tablet, Take 1 tablet by mouth daily, Disp: , Rfl:   •  metoprolol succinate (TOPROL-XL) 100 mg 24 hr tablet, Take 100 mg by mouth daily, Disp: , Rfl:   •  mirtazapine (REMERON) 30 mg tablet, Take 1 tablet (30 mg total) by mouth daily at bedtime, Disp: 30 tablet, Rfl: 0  •  mupirocin (BACTROBAN) 2 % ointment, Apply topically 2 (two) times a day, Disp: , Rfl:   •  OLANZapine (ZyPREXA) 7 5 mg tablet, Take 7 5 mg by mouth daily at bedtime, Disp: , Rfl:   •  oxyCODONE-acetaminophen (PERCOCET) 5-325 mg per tablet, Take 1 tablet by mouth every 8 (eight) hours as needed, Disp: , Rfl:   •  rosuvastatin (CRESTOR) 5 mg tablet, Take 5 mg by mouth daily, Disp: , Rfl:   •  sodium chloride 1 g tablet, Take 1 tablet (1 g total) by mouth 3 (three) times a day with meals, Disp: 90 tablet, Rfl: 0  •  ferrous sulfate 325 (65 Fe) mg tablet, , Disp: , Rfl:       Review of Systems  Review of Systems   Constitutional: Negative for activity change, chills, fever and unexpected weight change  HENT: Negative for congestion, ear pain, hearing loss and sore throat  Respiratory: Positive for shortness of breath  Negative for cough and chest tightness  Cardiovascular: Negative for chest pain and leg swelling  Gastrointestinal: Negative for abdominal pain, constipation, diarrhea, nausea and vomiting  Genitourinary: Negative for difficulty urinating, dysuria, frequency, menstrual problem, pelvic pain, vaginal discharge and vaginal pain  Skin: Negative for color change and rash  Neurological: Negative for dizziness, numbness and headaches  Psychiatric/Behavioral: Negative for agitation and confusion  Objective     /82 (BP Location: Right arm, Patient Position: Sitting, Cuff Size: Standard)   Ht 5' 4" (1 626 m)   Wt 112 kg (248 lb)   BMI 42 57 kg/m²       Physical Exam  Constitutional:       General: She is not in acute distress  Appearance: Normal appearance  She is obese  HENT:      Head: Normocephalic and atraumatic  Nose: Nose normal    Eyes:      Conjunctiva/sclera: Conjunctivae normal       Pupils: Pupils are equal, round, and reactive to light  Cardiovascular:      Rate and Rhythm: Normal rate  Pulses: Normal pulses  Pulmonary:      Effort: Pulmonary effort is normal    Musculoskeletal:         General: Normal range of motion  Cervical back: Normal range of motion  Neurological:      General: No focal deficit present  Mental Status: She is alert and oriented to person, place, and time  Mental status is at baseline  Psychiatric:         Mood and Affect: Mood normal          Behavior: Behavior normal          Thought Content: Thought content normal          Judgment: Judgment normal    Vitals and nursing note reviewed

## 2023-02-21 ENCOUNTER — HOSPITAL ENCOUNTER (OUTPATIENT)
Dept: PULMONOLOGY | Facility: HOSPITAL | Age: 63
Discharge: HOME | End: 2023-02-21
Attending: INTERNAL MEDICINE

## 2023-02-21 DIAGNOSIS — J84.9 ILD (INTERSTITIAL LUNG DISEASE) (HCC): ICD-10-CM

## 2023-02-21 RX ORDER — ALBUTEROL SULFATE 2.5 MG/3ML
2.5 SOLUTION RESPIRATORY (INHALATION) ONCE
Status: COMPLETED | OUTPATIENT
Start: 2023-02-21 | End: 2023-02-21

## 2023-02-21 RX ADMIN — ALBUTEROL SULFATE 2.5 MG: 2.5 SOLUTION RESPIRATORY (INHALATION) at 15:19

## 2023-03-22 ENCOUNTER — OFFICE VISIT (OUTPATIENT)
Dept: PULMONOLOGY | Facility: CLINIC | Age: 63
End: 2023-03-22

## 2023-03-22 VITALS
WEIGHT: 244 LBS | SYSTOLIC BLOOD PRESSURE: 126 MMHG | OXYGEN SATURATION: 97 % | BODY MASS INDEX: 41.66 KG/M2 | DIASTOLIC BLOOD PRESSURE: 80 MMHG | HEIGHT: 64 IN | TEMPERATURE: 97 F | HEART RATE: 68 BPM

## 2023-03-22 DIAGNOSIS — J84.9 ILD (INTERSTITIAL LUNG DISEASE) (HCC): Primary | ICD-10-CM

## 2023-03-22 DIAGNOSIS — E66.01 CLASS 3 SEVERE OBESITY DUE TO EXCESS CALORIES WITHOUT SERIOUS COMORBIDITY WITH BODY MASS INDEX (BMI) OF 40.0 TO 44.9 IN ADULT (HCC): ICD-10-CM

## 2023-03-22 DIAGNOSIS — G47.33 OSA (OBSTRUCTIVE SLEEP APNEA): ICD-10-CM

## 2023-03-22 DIAGNOSIS — Z87.891 HISTORY OF SMOKING: ICD-10-CM

## 2023-03-22 RX ORDER — SIMVASTATIN 10 MG
10 TABLET ORAL
COMMUNITY

## 2023-03-22 RX ORDER — FLUOXETINE HYDROCHLORIDE 40 MG/1
40 CAPSULE ORAL DAILY
COMMUNITY
Start: 2023-03-10

## 2023-03-22 NOTE — ASSESSMENT & PLAN NOTE
He is very obese and understands the need for weight reduction  Unfortunately she cannot exercise much  She stated that she is modifying her diet to lose weight

## 2023-03-22 NOTE — ASSESSMENT & PLAN NOTE
Ms Rekha Rivera was recently admitted to [facility] with hyponatremia  Her CT scan of the chest from 1/19/2023 showed a mosaic pattern with diffuse subpleural reticulation, suggestive of  interstitial lung disease  Her connective tissue work-up was negative except for elevation of sed rate to 42  Her PFT was unremarkable  Her 6-minute walk test showed exercise restriction due to her bad arthritis  Her chest auscultation was clear today  She needs a repeat CT scan, a high-resolution CT scan in 3 months to see the progress of her interstitial changes  I had a long discussion with her and have answered all her questions

## 2023-03-22 NOTE — PROGRESS NOTES
Assessment/Plan:    ILD (interstitial lung disease) ([facility] )  Ms Meño Cherry was recently admitted to [facility] with hyponatremia  Her CT scan of the chest from 1/19/2023 showed a mosaic pattern with diffuse subpleural reticulation, suggestive of  interstitial lung disease  Her connective tissue work-up was negative except for elevation of sed rate to 42  Her PFT was unremarkable  Her 6-minute walk test showed exercise restriction due to her bad arthritis  Her chest auscultation was clear today  She needs a repeat CT scan, a high-resolution CT scan in 3 months to see the progress of her interstitial changes  I had a long discussion with her and have answered all her questions  ALESSANDRA (obstructive sleep apnea)  She has a history of snoring, interrupted sleep and daytime sleepiness and tiredness  She is obese and has oropharyngeal crowding  She likely has obstructive sleep apnea  However she does not want any further investigation or treatment  I had a long discussion with her and answered all her questions  Class 3 severe obesity due to excess calories without serious comorbidity with body mass index (BMI) of 40 0 to 44 9 in adult [facility])  He is very obese and understands the need for weight reduction  Unfortunately she cannot exercise much  She stated that she is modifying her diet to lose weight  History of smoking  She was a smoker for 22 years, quit about 20 years back  She smoked up to 3 packs a day starting at the age of 15  Currently she is very motivated to remain quit  Benign essential hypertension  He has a history of hypertension and currently this is controlled with metoprolol and losartan  Diagnoses and all orders for this visit:    ILD (interstitial lung disease) ([facility] )  -     CT chest high resolution;  Future    Class 3 severe obesity due to excess calories without serious comorbidity with body mass index (BMI) of 40 0 to 44 9 in adult ([facility])    ALESSANDRA (obstructive sleep apnea)    History of smoking    Other orders  -     simvastatin (ZOCOR) 10 mg tablet; Take 10 mg by mouth  -     FLUoxetine (PROzac) 40 MG capsule; Take 40 mg by mouth daily          Subjective:      Patient ID: Phuong Parikh is a 58 y o  female  Mrs Alma Delia Ramírez came for follow-up for her suspected interstitial lung disease  Her previous CT scan from 1/19/2023 had shown some reticular changes at the lung bases and mosaic pattern  He had a connective tissue work-up which came negative except for slight elevation in sed rate to 42  She had a pulmonary function test which was unremarkable  She had significant restriction and ambulatory capacity because of her arthritis  She currently denies any significant shortness of breath  She has occasional cough no wheezing  No fever or chills  No chest pain no palpitations  She has history of snoring interrupted sleep and daytime sleepiness and tiredness  She likely has obstructive sleep apnea  However she does not want any  further evaluation or CPAP therapy  She has significant ambulatory issues and uses a walker  She also has psychiatric issues  She was a smoker in the past   Currently not on any inhalers or oxygen  We plan to do a repeat CT scan, and high-resolution CT scan 3 months after the previous study to see the progress of the previous findings  The following portions of the patient's history were reviewed and updated as appropriate: allergies, current medications, past family history, past medical history, past social history, past surgical history and problem list     Review of Systems   Constitutional: Positive for fatigue  Negative for appetite change, chills and fever  HENT: Negative for hearing loss, rhinorrhea, sneezing, sore throat, trouble swallowing and voice change  Eyes: Negative for visual disturbance  Respiratory: Positive for cough and shortness of breath  Negative for wheezing      Cardiovascular: Negative for chest pain, palpitations and leg swelling  Gastrointestinal: Positive for constipation  Negative for abdominal pain, diarrhea, nausea and vomiting  Genitourinary: Negative for dysuria, frequency and urgency  Musculoskeletal: Positive for arthralgias and gait problem  Negative for back pain  Skin: Negative for rash  Allergic/Immunologic: Positive for environmental allergies  Neurological: Negative for dizziness, syncope, light-headedness and headaches  Psychiatric/Behavioral: Positive for sleep disturbance  Negative for agitation and confusion  Objective:      /80   Pulse 68   Temp (!) 97 °F (36 1 °C)   Ht 5' 4" (1 626 m)   Wt 111 kg (244 lb)   SpO2 97%   BMI 41 88 kg/m²          Physical Exam  Vitals reviewed  Constitutional:       General: She is not in acute distress  Appearance: She is obese  She is ill-appearing  She is not toxic-appearing  HENT:      Head: Normocephalic  Mouth/Throat:      Mouth: Mucous membranes are moist       Comments: mallampatti class 3  Eyes:      General: No scleral icterus  Conjunctiva/sclera: Conjunctivae normal    Cardiovascular:      Rate and Rhythm: Normal rate and regular rhythm  Heart sounds: Normal heart sounds  No murmur heard  Pulmonary:      Effort: Pulmonary effort is normal  No respiratory distress  Breath sounds: Normal breath sounds  No stridor  No wheezing, rhonchi or rales  Chest:      Chest wall: No tenderness  Abdominal:      General: Bowel sounds are normal       Palpations: Abdomen is soft  Tenderness: There is no abdominal tenderness  There is no guarding  Musculoskeletal:      Cervical back: No rigidity  Right lower leg: No edema  Left lower leg: No edema  Lymphadenopathy:      Cervical: No cervical adenopathy  Skin:     Coloration: Skin is not jaundiced or pale  Findings: Rash present  Neurological:      Mental Status: She is alert and oriented to person, place, and time  Gait: Gait abnormal    Psychiatric:         Mood and Affect: Mood normal          Behavior: Behavior normal          Thought Content:  Thought content normal          Judgment: Judgment normal

## 2023-03-22 NOTE — ASSESSMENT & PLAN NOTE
She has a history of snoring, interrupted sleep and daytime sleepiness and tiredness  She is obese and has oropharyngeal crowding  She likely has obstructive sleep apnea  However she does not want any further investigation or treatment  I had a long discussion with her and answered all her questions

## 2023-04-24 ENCOUNTER — HOSPITAL ENCOUNTER (OUTPATIENT)
Dept: CT IMAGING | Facility: HOSPITAL | Age: 63
Discharge: HOME | End: 2023-04-24
Attending: INTERNAL MEDICINE

## 2023-04-24 DIAGNOSIS — J84.9 ILD (INTERSTITIAL LUNG DISEASE) (HCC): ICD-10-CM

## 2023-04-26 ENCOUNTER — TELEPHONE (OUTPATIENT)
Dept: PULMONOLOGY | Facility: CLINIC | Age: 63
End: 2023-04-26

## 2023-04-26 NOTE — TELEPHONE ENCOUNTER
Roxane Barros from [place] called to advise of significant findings from CT scan of chest done on 4/24

## 2024-05-24 ENCOUNTER — HOSPITAL ENCOUNTER (OUTPATIENT)
Dept: ULTRASOUND IMAGING | Facility: HOSPITAL | Age: 64
End: 2024-05-24
Payer: MEDICARE

## 2024-05-24 DIAGNOSIS — N83.9 LESION OF RIGHT OVARY: ICD-10-CM

## 2024-05-24 PROCEDURE — 76856 US EXAM PELVIC COMPLETE: CPT

## 2024-05-24 PROCEDURE — 76830 TRANSVAGINAL US NON-OB: CPT

## 2024-09-24 ENCOUNTER — OFFICE VISIT (OUTPATIENT)
Dept: PULMONOLOGY | Facility: CLINIC | Age: 64
End: 2024-09-24
Payer: MEDICARE

## 2024-09-24 VITALS
BODY MASS INDEX: 43.71 KG/M2 | SYSTOLIC BLOOD PRESSURE: 110 MMHG | WEIGHT: 256 LBS | TEMPERATURE: 97.6 F | HEART RATE: 62 BPM | OXYGEN SATURATION: 98 % | HEIGHT: 64 IN | DIASTOLIC BLOOD PRESSURE: 76 MMHG

## 2024-09-24 DIAGNOSIS — J84.9 ILD (INTERSTITIAL LUNG DISEASE) (HCC): Primary | ICD-10-CM

## 2024-09-24 DIAGNOSIS — G47.33 OSA (OBSTRUCTIVE SLEEP APNEA): ICD-10-CM

## 2024-09-24 DIAGNOSIS — E66.813 CLASS 3 SEVERE OBESITY DUE TO EXCESS CALORIES WITHOUT SERIOUS COMORBIDITY WITH BODY MASS INDEX (BMI) OF 40.0 TO 44.9 IN ADULT (HCC): ICD-10-CM

## 2024-09-24 DIAGNOSIS — J43.9 PULMONARY EMPHYSEMA, UNSPECIFIED EMPHYSEMA TYPE (HCC): ICD-10-CM

## 2024-09-24 DIAGNOSIS — E66.01 CLASS 3 SEVERE OBESITY DUE TO EXCESS CALORIES WITHOUT SERIOUS COMORBIDITY WITH BODY MASS INDEX (BMI) OF 40.0 TO 44.9 IN ADULT (HCC): ICD-10-CM

## 2024-09-24 PROCEDURE — 99215 OFFICE O/P EST HI 40 MIN: CPT | Performed by: INTERNAL MEDICINE

## 2024-09-24 RX ORDER — TRIAMCINOLONE ACETONIDE 1 MG/G
CREAM TOPICAL AS NEEDED
COMMUNITY

## 2024-09-24 RX ORDER — ALBUTEROL SULFATE 90 UG/1
2 INHALANT RESPIRATORY (INHALATION) EVERY 6 HOURS PRN
Qty: 18 G | Refills: 2 | Status: SHIPPED | OUTPATIENT
Start: 2024-09-24

## 2024-09-24 NOTE — ASSESSMENT & PLAN NOTE
He was found to have structural emphysema on his CT scan.She was a smoker for 22 years, quit about 20 years back.  She smoked up to 3 packs a day starting at the age of 12.  Currently she is having cough which is nonproductive.  No significant shortness of breath or wheeze.  We will give her a trial with albuterol as needed.  Orders:    albuterol (Ventolin HFA) 90 mcg/act inhaler; Inhale 2 puffs every 6 (six) hours as needed for wheezing or shortness of breath    Complete PFT with post Bronchodilator and Six Minute walk; Future

## 2024-09-24 NOTE — ASSESSMENT & PLAN NOTE
Ms.Sherri Davila was previously admitted to [facility] with hyponatremia.  Her CT scan of the chest from 1/19/2023 showed a mosaic pattern with diffuse subpleural reticulation, suggestive of  interstitial lung disease.    Her connective tissue work-up was negative except for elevation of sed rate to 42.  Her PFT was unremarkable.  Her 6-minute walk test showed exercise restriction due to bad arthritis.  Her chest auscultation was clear today.  Her previous high-resolution CT scan of the chest showed evidence of interstitial lung disease.  There was no bronchiectasis, diffuse nodularity or groundglass infiltrates or honeycombing.  There was mild peripheral subpleural reticulation with interposed areas of unaffected lung.  The findings were more pronounced in the anterior left upper lobe.  Currently she has cough.  I have ordered a repeat CT scan and a pulmonary function test.  I had a long discussion with her and have answered all her questions.   Orders:    Complete PFT with post Bronchodilator and Six Minute walk; Future    CT chest high resolution; Future

## 2024-09-24 NOTE — ASSESSMENT & PLAN NOTE
She has had snoring, interrupted sleep and daytime sleepiness and tiredness. She is obese and has oropharyngeal crowding. She likely has obstructive sleep apnea. However she does not want any further investigation or treatment. I had a long discussion with her and answered all her questions.

## 2024-09-24 NOTE — PROGRESS NOTES
Ambulatory Visit  Name: Rekha Davila      : 1960      MRN: [id number]  Encounter Provider: Jordan Onofre MD  Encounter Date: 2024   Encounter department: [facility] PUL[facility] & [facility] [place]    Assessment & Plan  ILD (interstitial lung disease) ([facility])  Ms.Sherri Davila was previously admitted to [facility] with hyponatremia.  Her CT scan of the chest from 2023 showed a mosaic pattern with diffuse subpleural reticulation, suggestive of  interstitial lung disease.    Her connective tissue work-up was negative except for elevation of sed rate to 42.  Her PFT was unremarkable.  Her 6-minute walk test showed exercise restriction due to bad arthritis.  Her chest auscultation was clear today.  Her previous high-resolution CT scan of the chest showed evidence of interstitial lung disease.  There was no bronchiectasis, diffuse nodularity or groundglass infiltrates or honeycombing.  There was mild peripheral subpleural reticulation with interposed areas of unaffected lung.  The findings were more pronounced in the anterior left upper lobe.  Currently she has cough.  I have ordered a repeat CT scan and a pulmonary function test.  I had a long discussion with her and have answered all her questions.   Orders:    Complete PFT with post Bronchodilator and Six Minute walk; Future    CT chest high resolution; Future    Class 3 severe obesity due to excess calories without serious comorbidity with body mass index (BMI) of 40.0 to 44.9 in adult (HCC)  She is morbidly obese and understands the need for weight reduction.       Pulmonary emphysema, unspecified emphysema type (HCC)  He was found to have structural emphysema on his CT scan.She was a smoker for 22 years, quit about 20 years back.  She smoked up to 3 packs a day starting at the age of 12.  Currently she is having cough which is nonproductive.  No significant shortness of breath or wheeze.  We will give her a trial with albuterol  as needed.  Orders:    albuterol (Ventolin HFA) 90 mcg/act inhaler; Inhale 2 puffs every 6 (six) hours as needed for wheezing or shortness of breath    Complete PFT with post Bronchodilator and Six Minute walk; Future    ALESSANDRA (obstructive sleep apnea)  She has had snoring, interrupted sleep and daytime sleepiness and tiredness. She is obese and has oropharyngeal crowding. She likely has obstructive sleep apnea. However she does not want any further investigation or treatment. I had a long discussion with her and answered all her questions.            History of Present Illness       Rekha Davila is a 64 y.o. female with past medical history of interstitial lung disease emphysema suspected obstructive sleep apnea and obesity who has come for a follow-up after a long time.  She was found to have interstitial lung disease and her high-resolution CT scan did not show any UIP or NSIP pattern.  It was nonspecific.  Her pulmonary function tests were unremarkable.  She is not on any inhalers or oxygen.  Currently she has cough which is nonproductive.  She denied any significant shortness of breath or wheeze or chest pain.  She is morbidly obese and understands the need for weight reduction.  She is a previous smoker quit about 25 years back.  She used to smoke at least 3 packs a day.  She also had daytime sleepiness and tiredness and interrupted sleep.  I had suspected sleep apnea and advised a sleep study which she refused.  She continues to refuse any sleep study or CPAP.  She also has a history of hypertension.  She denied any swelling of feet or palpitations.  Her appetite has been good.  Her previous CT scan showed evidence of structural emphysema    Review of Systems   Constitutional:  Negative for appetite change, chills, fatigue and fever.   HENT:  Positive for rhinorrhea and sneezing. Negative for hearing loss and trouble swallowing.    Respiratory:  Positive for cough. Negative for shortness of breath and  "wheezing.    Cardiovascular:  Negative for chest pain, palpitations and leg swelling.   Gastrointestinal:  Positive for constipation. Negative for abdominal pain, diarrhea, nausea and vomiting.   Genitourinary:  Positive for urgency. Negative for dysuria and frequency.   Musculoskeletal:  Negative for arthralgias, back pain and gait problem.   Skin:  Negative for rash.   Allergic/Immunologic: Positive for environmental allergies.   Neurological:  Negative for dizziness, syncope, light-headedness and headaches.   Psychiatric/Behavioral:  Positive for sleep disturbance. Negative for agitation and confusion. The patient is not nervous/anxious.            Objective     /76 (BP Location: Left arm, Patient Position: Sitting, Cuff Size: Standard)   Pulse 62   Temp 97.6 °F (36.4 °C) (Temporal)   Ht 5' 4\" (1.626 m)   Wt 116 kg (256 lb)   SpO2 98%   BMI 43.94 kg/m²     Physical Exam  Vitals reviewed.   Constitutional:       General: She is not in acute distress.     Appearance: She is obese. She is not ill-appearing, toxic-appearing or diaphoretic.   HENT:      Head: Normocephalic.      Mouth/Throat:      Mouth: Mucous membranes are moist.      Pharynx: Oropharynx is clear.   Eyes:      General: No scleral icterus.     Conjunctiva/sclera: Conjunctivae normal.   Cardiovascular:      Rate and Rhythm: Normal rate and regular rhythm.      Heart sounds: Normal heart sounds. No murmur heard.  Pulmonary:      Effort: Pulmonary effort is normal. No respiratory distress.      Breath sounds: No stridor. No wheezing, rhonchi or rales.   Chest:      Chest wall: No tenderness.   Abdominal:      General: Bowel sounds are normal.      Palpations: Abdomen is soft.      Tenderness: There is no abdominal tenderness. There is no guarding.   Musculoskeletal:      Cervical back: Neck supple. No rigidity.      Right lower leg: No edema.      Left lower leg: No edema.   Lymphadenopathy:      Cervical: No cervical adenopathy.   Skin:     " Coloration: Skin is not jaundiced or pale.      Findings: No rash.   Neurological:      Mental Status: She is alert and oriented to person, place, and time.   Psychiatric:         Mood and Affect: Mood normal.         Behavior: Behavior normal.         Thought Content: Thought content normal.         Judgment: Judgment normal.     I spent 45 minutes of time take care of this patient with complex medical issues.  The majority of this time was spent directly with the patient counseling as well as coordinating care.

## 2024-10-10 ENCOUNTER — HOSPITAL ENCOUNTER (OUTPATIENT)
Dept: CT IMAGING | Facility: HOSPITAL | Age: 64
End: 2024-10-10
Attending: INTERNAL MEDICINE
Payer: MEDICARE

## 2024-10-10 ENCOUNTER — HOSPITAL ENCOUNTER (OUTPATIENT)
Dept: PULMONOLOGY | Facility: HOSPITAL | Age: 64
End: 2024-10-10
Attending: INTERNAL MEDICINE
Payer: MEDICARE

## 2024-10-10 DIAGNOSIS — J43.9 PULMONARY EMPHYSEMA, UNSPECIFIED EMPHYSEMA TYPE (HCC): ICD-10-CM

## 2024-10-10 DIAGNOSIS — J84.9 ILD (INTERSTITIAL LUNG DISEASE) (HCC): ICD-10-CM

## 2024-10-10 PROCEDURE — 94726 PLETHYSMOGRAPHY LUNG VOLUMES: CPT

## 2024-10-10 PROCEDURE — 94060 EVALUATION OF WHEEZING: CPT

## 2024-10-10 PROCEDURE — 94729 DIFFUSING CAPACITY: CPT

## 2024-10-10 PROCEDURE — 94618 PULMONARY STRESS TESTING: CPT | Performed by: INTERNAL MEDICINE

## 2024-10-10 PROCEDURE — 94618 PULMONARY STRESS TESTING: CPT

## 2024-10-10 PROCEDURE — 94726 PLETHYSMOGRAPHY LUNG VOLUMES: CPT | Performed by: INTERNAL MEDICINE

## 2024-10-10 PROCEDURE — 94060 EVALUATION OF WHEEZING: CPT | Performed by: INTERNAL MEDICINE

## 2024-10-10 PROCEDURE — 94729 DIFFUSING CAPACITY: CPT | Performed by: INTERNAL MEDICINE

## 2024-10-10 PROCEDURE — 71250 CT THORAX DX C-: CPT

## 2024-10-10 RX ORDER — ALBUTEROL SULFATE 0.83 MG/ML
2.5 SOLUTION RESPIRATORY (INHALATION) ONCE
Status: COMPLETED | OUTPATIENT
Start: 2024-10-10 | End: 2024-10-10

## 2024-10-10 RX ADMIN — ALBUTEROL SULFATE 2.5 MG: 2.5 SOLUTION RESPIRATORY (INHALATION) at 09:41

## 2024-10-24 ENCOUNTER — TELEPHONE (OUTPATIENT)
Age: 64
End: 2024-10-24

## 2024-10-24 NOTE — TELEPHONE ENCOUNTER
Lisa called back on behalf of patient, requesting to speak with Dr Onofre. She understood current status of diagnosis but would like to discuss progression possibilities. I advised her I would send the Dr a note and someone would get back to her shortly. Patient requested c/b to discuss. Please advise.

## 2024-10-31 NOTE — TELEPHONE ENCOUNTER
Spoke to patient.  CT scan and PFT results discussed.  No intervention needed at this time.  Patient to follow-up in the pulmonary office as scheduled

## 2024-12-03 ENCOUNTER — OFFICE VISIT (OUTPATIENT)
Dept: PULMONOLOGY | Facility: CLINIC | Age: 64
End: 2024-12-03
Payer: MEDICARE

## 2024-12-03 VITALS
OXYGEN SATURATION: 96 % | TEMPERATURE: 97.3 F | SYSTOLIC BLOOD PRESSURE: 124 MMHG | HEART RATE: 72 BPM | WEIGHT: 257 LBS | BODY MASS INDEX: 43.87 KG/M2 | HEIGHT: 64 IN | DIASTOLIC BLOOD PRESSURE: 80 MMHG

## 2024-12-03 DIAGNOSIS — J84.9 ILD (INTERSTITIAL LUNG DISEASE) (HCC): ICD-10-CM

## 2024-12-03 DIAGNOSIS — J43.9 PULMONARY EMPHYSEMA, UNSPECIFIED EMPHYSEMA TYPE (HCC): Primary | ICD-10-CM

## 2024-12-03 DIAGNOSIS — E66.813 CLASS 3 SEVERE OBESITY DUE TO EXCESS CALORIES WITHOUT SERIOUS COMORBIDITY WITH BODY MASS INDEX (BMI) OF 40.0 TO 44.9 IN ADULT (HCC): ICD-10-CM

## 2024-12-03 DIAGNOSIS — E66.01 CLASS 3 SEVERE OBESITY DUE TO EXCESS CALORIES WITHOUT SERIOUS COMORBIDITY WITH BODY MASS INDEX (BMI) OF 40.0 TO 44.9 IN ADULT (HCC): ICD-10-CM

## 2024-12-03 DIAGNOSIS — J43.9 PULMONARY EMPHYSEMA, UNSPECIFIED EMPHYSEMA TYPE (HCC): ICD-10-CM

## 2024-12-03 DIAGNOSIS — G47.33 OSA (OBSTRUCTIVE SLEEP APNEA): ICD-10-CM

## 2024-12-03 PROCEDURE — 99214 OFFICE O/P EST MOD 30 MIN: CPT | Performed by: INTERNAL MEDICINE

## 2024-12-03 RX ORDER — ALBUTEROL SULFATE 90 UG/1
2 INHALANT RESPIRATORY (INHALATION) EVERY 6 HOURS PRN
Qty: 18 G | Refills: 2 | Status: SHIPPED | OUTPATIENT
Start: 2024-12-03

## 2024-12-03 RX ORDER — ALBUTEROL SULFATE 90 UG/1
2 INHALANT RESPIRATORY (INHALATION) EVERY 6 HOURS PRN
Qty: 8.5 G | OUTPATIENT
Start: 2024-12-03

## 2024-12-03 NOTE — PROGRESS NOTES
Name: Rekha Davila      : 1960      MRN: [id number]  Encounter Provider: Jordan Onofre MD  Encounter Date: 12/3/2024   Encounter department: [facility] PULMONARY & [facility]  :  Assessment & Plan  ILD (interstitial lung disease) ([facility])  Ms.Sherri Davila had a previous CT scan of the chest from 2023 which showed a mosaic pattern with diffuse subpleural reticulation, suggestive of  interstitial lung disease.    Her connective tissue work-up was negative except for elevation of sed rate to 42.   Her 6-minute walk test showed exercise restriction due to bad arthritis.  Her chest auscultation has been clear .  Her previous high-resolution CT scan of the chest showed evidence of interstitial lung disease.  There was no bronchiectasis, diffuse nodularity or groundglass infiltrates or honeycombing.  There was mild peripheral subpleural reticulation with interposed areas of unaffected lung.  These findings were more pronounced in the anterior left upper lobe.  Her recent pulmonary function test was unremarkable.  Her repeat CT scan of the chest showed mild reticular changes same as noted before.  This is most likely related to her emphysema.  We will continue to observe her at this time as she is not symptomatic and her PFT is within normal limits.  I had a long discussion with her and have answered all questions.     ALESSANDRA (obstructive sleep apnea)  She has had snoring, interrupted sleep and daytime sleepiness and tiredness. She is obese and has oropharyngeal crowding. She likely has an effusion obstructive sleep apnea. However she does not want any further investigation or treatment. I had a long discussion with her and answered all her questions.        Class 3 severe obesity due to excess calories without serious comorbidity with body mass index (BMI) of 40.0 to 44.9 in adult ([facility])  She is morbidly obese and understands the need for weight reduction.          Pulmonary emphysema,  unspecified emphysema type (HCC)  She was found to have structural emphysema on his CT scan.She was a smoker for 22 years, quit about 20 years back.  She smoked up to 3 packs a day starting at the age of 12.  Currently she is having cough which is nonproductive.  No significant shortness of breath or wheeze.  Her recent PFT was unremarkable.  She found benefit from using albuterol and it helped her shortness of breath and wheeze and cough.  I have advised her to continue with albuterol as needed which she uses couple of times day.  I offered to start her on a long-acting inhaler which she wants to hold off for now.  I had a long discussion with her and have answered all her questions.  Orders:    albuterol (Ventolin HFA) 90 mcg/act inhaler; Inhale 2 puffs every 6 (six) hours as needed for wheezing or shortness of breath        History of Present Illness     HPI  Rekha Davila is a 64 y.o. female with past medical history of hypertension obesity and emphysema who was found to have reticular changes suggestive of pulmonary fibrosis/interstitial lung disease on her CT scan.  Her pulmonary function tests were within normal limits.  Her high-resolution CT scan showed only mild changes not different from the previous scan.  Currently she is using albuterol inhaler and this has controlled her symptoms of shortness of breath and wheeze very well.  She uses it at least couple of times a day.  She has gained more weight and I have advised her in this regard.  She had a history suggestive of sleep apnea but is not interested in sleep study or CPAP.  She was a previous smoker.  She denied any weight loss or anorexia or hemoptysis.  I offered to start her on long-acting inhaler which she wants to hold off for now.  Currently she has no significant cough or wheeze.  No chest pain no palpitations.  He stated that albuterol inhaler has helped her tremendously.      Review of Systems   Constitutional:  Positive for fatigue.  "Negative for appetite change, chills, fever and unexpected weight change.   HENT:  Positive for rhinorrhea. Negative for hearing loss, sneezing, sore throat and trouble swallowing.    Respiratory:  Positive for cough, shortness of breath and wheezing.    Cardiovascular:  Positive for leg swelling. Negative for chest pain and palpitations.   Gastrointestinal:  Positive for constipation. Negative for abdominal pain, diarrhea, nausea and vomiting.   Genitourinary:  Negative for dysuria, frequency and urgency.   Musculoskeletal:  Negative for arthralgias, back pain and gait problem.   Skin:  Positive for rash.   Allergic/Immunologic: Positive for environmental allergies.   Neurological:  Negative for dizziness, seizures, syncope, light-headedness and headaches.   Psychiatric/Behavioral:  Positive for sleep disturbance. Negative for agitation and confusion. The patient is not nervous/anxious.           Objective   /80 (BP Location: Left arm, Patient Position: Sitting, Cuff Size: Standard)   Pulse 72   Temp (!) 97.3 °F (36.3 °C) (Tympanic)   Ht 5' 4\" (1.626 m)   Wt 117 kg (257 lb)   SpO2 96%   BMI 44.11 kg/m²      Physical Exam  Vitals reviewed.   Constitutional:       General: She is not in acute distress.     Appearance: She is obese. She is not ill-appearing, toxic-appearing or diaphoretic.   HENT:      Head: Normocephalic.      Mouth/Throat:      Mouth: Mucous membranes are moist.   Eyes:      General: No scleral icterus.     Conjunctiva/sclera: Conjunctivae normal.   Cardiovascular:      Rate and Rhythm: Normal rate and regular rhythm.      Heart sounds: Normal heart sounds. No murmur heard.  Pulmonary:      Effort: Pulmonary effort is normal. No respiratory distress.      Breath sounds: Normal breath sounds. No stridor. No wheezing, rhonchi or rales.   Chest:      Chest wall: No tenderness.   Abdominal:      General: Bowel sounds are normal.      Palpations: Abdomen is soft.      Tenderness: There is no " abdominal tenderness. There is no guarding.   Musculoskeletal:      Cervical back: Neck supple. No rigidity.      Right lower leg: No edema.      Left lower leg: No edema.   Lymphadenopathy:      Cervical: No cervical adenopathy.   Skin:     Coloration: Skin is not jaundiced or pale.      Findings: No rash.   Neurological:      Mental Status: She is alert and oriented to person, place, and time.      Gait: Gait normal.   Psychiatric:         Mood and Affect: Mood normal.         Behavior: Behavior normal.         Thought Content: Thought content normal.         Judgment: Judgment normal.

## 2024-12-03 NOTE — ASSESSMENT & PLAN NOTE
She was found to have structural emphysema on his CT scan.She was a smoker for 22 years, quit about 20 years back.  She smoked up to 3 packs a day starting at the age of 12.  Currently she is having cough which is nonproductive.  No significant shortness of breath or wheeze.  Her recent PFT was unremarkable.  She found benefit from using albuterol and it helped her shortness of breath and wheeze and cough.  I have advised her to continue with albuterol as needed which she uses couple of times day.  I offered to start her on a long-acting inhaler which she wants to hold off for now.  I had a long discussion with her and have answered all her questions.  Orders:    albuterol (Ventolin HFA) 90 mcg/act inhaler; Inhale 2 puffs every 6 (six) hours as needed for wheezing or shortness of breath

## 2024-12-03 NOTE — ASSESSMENT & PLAN NOTE
She has had snoring, interrupted sleep and daytime sleepiness and tiredness. She is obese and has oropharyngeal crowding. She likely has an effusion obstructive sleep apnea. However she does not want any further investigation or treatment. I had a long discussion with her and answered all her questions.

## 2024-12-03 NOTE — ASSESSMENT & PLAN NOTE
Ms.Sherri Davila had a previous CT scan of the chest from 1/19/2023 which showed a mosaic pattern with diffuse subpleural reticulation, suggestive of  interstitial lung disease.    Her connective tissue work-up was negative except for elevation of sed rate to 42.   Her 6-minute walk test showed exercise restriction due to bad arthritis.  Her chest auscultation has been clear .  Her previous high-resolution CT scan of the chest showed evidence of interstitial lung disease.  There was no bronchiectasis, diffuse nodularity or groundglass infiltrates or honeycombing.  There was mild peripheral subpleural reticulation with interposed areas of unaffected lung.  These findings were more pronounced in the anterior left upper lobe.  Her recent pulmonary function test was unremarkable.  Her repeat CT scan of the chest showed mild reticular changes same as noted before.  This is most likely related to her emphysema.  We will continue to observe her at this time as she is not symptomatic and her PFT is within normal limits.  I had a long discussion with her and have answered all questions.

## 2025-02-04 ENCOUNTER — HOSPITAL ENCOUNTER (EMERGENCY)
Facility: HOSPITAL | Age: 65
Discharge: HOME | End: 2025-02-04
Attending: EMERGENCY MEDICINE
Payer: MEDICARE

## 2025-02-04 VITALS
OXYGEN SATURATION: 100 % | DIASTOLIC BLOOD PRESSURE: 65 MMHG | RESPIRATION RATE: 18 BRPM | HEART RATE: 72 BPM | SYSTOLIC BLOOD PRESSURE: 142 MMHG

## 2025-02-04 DIAGNOSIS — R04.0 EPISTAXIS: Primary | ICD-10-CM

## 2025-02-04 PROCEDURE — 99282 EMERGENCY DEPT VISIT SF MDM: CPT

## 2025-02-04 PROCEDURE — 99284 EMERGENCY DEPT VISIT MOD MDM: CPT | Performed by: EMERGENCY MEDICINE

## 2025-02-04 PROCEDURE — 30903 CONTROL OF NOSEBLEED: CPT | Performed by: EMERGENCY MEDICINE

## 2025-02-04 RX ORDER — OXYMETAZOLINE HYDROCHLORIDE 0.05 G/100ML
2 SPRAY NASAL ONCE
Status: COMPLETED | OUTPATIENT
Start: 2025-02-04 | End: 2025-02-04

## 2025-02-04 RX ADMIN — CEPHALEXIN 500 MG: 250 CAPSULE ORAL at 22:47

## 2025-02-04 RX ADMIN — OXYMETAZOLINE HYDROCHLORIDE 2 SPRAY: 0.05 SPRAY NASAL at 22:20

## 2025-02-05 ENCOUNTER — NURSE TRIAGE (OUTPATIENT)
Age: 65
End: 2025-02-05

## 2025-02-05 ENCOUNTER — APPOINTMENT (OUTPATIENT)
Dept: LAB | Facility: HOSPITAL | Age: 65
End: 2025-02-05
Payer: MEDICARE

## 2025-02-05 ENCOUNTER — TELEPHONE (OUTPATIENT)
Dept: INTERNAL MEDICINE CLINIC | Facility: CLINIC | Age: 65
End: 2025-02-05

## 2025-02-05 ENCOUNTER — TELEPHONE (OUTPATIENT)
Age: 65
End: 2025-02-05

## 2025-02-05 DIAGNOSIS — R04.0 EPISTAXIS: ICD-10-CM

## 2025-02-05 DIAGNOSIS — N18.32 CHRONIC KIDNEY DISEASE (CKD) STAGE G3B/A1, MODERATELY DECREASED GLOMERULAR FILTRATION RATE (GFR) BETWEEN 30-44 ML/MIN/1.73 SQUARE METER AND ALBUMINURIA CREATININE RATIO LESS THAN 30 MG/G (HCC): ICD-10-CM

## 2025-02-05 LAB
ALBUMIN SERPL BCG-MCNC: 4.6 G/DL (ref 3.5–5)
ANION GAP SERPL CALCULATED.3IONS-SCNC: 9 MMOL/L (ref 4–13)
BASOPHILS # BLD AUTO: 0.06 THOUSANDS/ΜL (ref 0–0.1)
BASOPHILS NFR BLD AUTO: 1 % (ref 0–1)
BUN SERPL-MCNC: 19 MG/DL (ref 5–25)
CALCIUM SERPL-MCNC: 10.4 MG/DL (ref 8.4–10.2)
CHLORIDE SERPL-SCNC: 101 MMOL/L (ref 96–108)
CO2 SERPL-SCNC: 26 MMOL/L (ref 21–32)
CREAT SERPL-MCNC: 1.42 MG/DL (ref 0.6–1.3)
EOSINOPHIL # BLD AUTO: 0.04 THOUSAND/ΜL (ref 0–0.61)
EOSINOPHIL NFR BLD AUTO: 0 % (ref 0–6)
ERYTHROCYTE [DISTWIDTH] IN BLOOD BY AUTOMATED COUNT: 14.1 % (ref 11.6–15.1)
GFR SERPL CREATININE-BSD FRML MDRD: 39 ML/MIN/1.73SQ M
GLUCOSE SERPL-MCNC: 94 MG/DL (ref 65–140)
HCT VFR BLD AUTO: 37.8 % (ref 34.8–46.1)
HGB BLD-MCNC: 12.1 G/DL (ref 11.5–15.4)
IMM GRANULOCYTES # BLD AUTO: 0.04 THOUSAND/UL (ref 0–0.2)
IMM GRANULOCYTES NFR BLD AUTO: 0 % (ref 0–2)
INR PPP: 1.06 (ref 0.85–1.19)
LYMPHOCYTES # BLD AUTO: 1.21 THOUSANDS/ΜL (ref 0.6–4.47)
LYMPHOCYTES NFR BLD AUTO: 12 % (ref 14–44)
MCH RBC QN AUTO: 27.3 PG (ref 26.8–34.3)
MCHC RBC AUTO-ENTMCNC: 32 G/DL (ref 31.4–37.4)
MCV RBC AUTO: 85 FL (ref 82–98)
MONOCYTES # BLD AUTO: 0.72 THOUSAND/ΜL (ref 0.17–1.22)
MONOCYTES NFR BLD AUTO: 7 % (ref 4–12)
NEUTROPHILS # BLD AUTO: 8 THOUSANDS/ΜL (ref 1.85–7.62)
NEUTS SEG NFR BLD AUTO: 80 % (ref 43–75)
NRBC BLD AUTO-RTO: 0 /100 WBCS
PHOSPHATE SERPL-MCNC: 4 MG/DL (ref 2.3–4.1)
PLATELET # BLD AUTO: 354 THOUSANDS/UL (ref 149–390)
PMV BLD AUTO: 8.8 FL (ref 8.9–12.7)
POTASSIUM SERPL-SCNC: 4.5 MMOL/L (ref 3.5–5.3)
PROTHROMBIN TIME: 14.2 SECONDS (ref 12.3–15)
RBC # BLD AUTO: 4.43 MILLION/UL (ref 3.81–5.12)
SODIUM SERPL-SCNC: 136 MMOL/L (ref 135–147)
WBC # BLD AUTO: 10.07 THOUSAND/UL (ref 4.31–10.16)

## 2025-02-05 PROCEDURE — 85025 COMPLETE CBC W/AUTO DIFF WBC: CPT

## 2025-02-05 PROCEDURE — 36415 COLL VENOUS BLD VENIPUNCTURE: CPT

## 2025-02-05 PROCEDURE — 80069 RENAL FUNCTION PANEL: CPT

## 2025-02-05 PROCEDURE — 85610 PROTHROMBIN TIME: CPT

## 2025-02-05 NOTE — TELEPHONE ENCOUNTER
Received a STAT referral for patient to see ENT for packing removal from ED visit. Called and spoke to patient and offered her Friday 2/7/24 at 1pm and patient was agreeable. Patient stated she needs transportation in which I spoke to my clinical coordinator, Maria JOHNS, and she approved a uber ride.     Scheduled patient thru online portal, roundtrip.    Informed patient, patient thankful.

## 2025-02-05 NOTE — TELEPHONE ENCOUNTER
Patient called to schedule appt to have packing removed, tried to warm tx to [facility] office but no one was available, please give her a call to schedule.  She also will need transportation.  Thank you!!

## 2025-02-05 NOTE — TELEPHONE ENCOUNTER
"PT warm transferred from [place] to this CTS-RN. PT had nosebleed in L nostril last night and went to ED where nostril was packed. PT instructed to follow-up with ENT.     PT reports that L nostril has a little opening where it is not packed and she sees a little bit of blood move in and out of the whole when she breathes; asking what to do. PT not on anti-coagulants. PT speaking in clear complete sentences throughout call; no wheezing/stridor noted.     Next available appointment in  ENT office is Aug 2025. Called ENT  office clerical backline . On hold approx 5 min and line disconnected.     Advised PT to go back to ED or offered phone numbers for [facility] at [facility] since both accept Medicaid. PT verbalized understanding and agreeable to plan.    Answer Assessment - Initial Assessment Questions  1. AMOUNT OF BLEEDING: \"How bad is the bleeding?\" \"How much blood was lost?\" \"Has the bleeding stopped?\"      Sees blood going in and out of nostril  2. ONSET: \"When did the nosebleed start?\"       yesterday  3. FREQUENCY: \"How many nosebleeds have you had in the last 24 hours?\"       Nostril is packed  4. RECURRENT SYMPTOMS: \"Have there been other recent nosebleeds?\" If Yes, ask: \"How long did it take you to stop the bleeding?\" \"What worked best?\"       unsure  5. CAUSE: \"What do you think caused this nosebleed?\"      unsure  6. LOCAL FACTORS: \"Do you have any cold symptoms?\", \"Have you been rubbing or picking at your nose?\"      no  7. SYSTEMIC FACTORS: \"Do you have high blood pressure or any bleeding problems?\"      no  8. BLOOD THINNERS: \"Do you take any blood thinners?\" (e.g., aspirin, clopidogrel / Plavix, coumadin, heparin). Notes: Other strong blood thinners include: Arixtra (fondaparinux), Eliquis (apixaban), Pradaxa (dabigatran), and Xarelto (rivaroxaban).      no  9. OTHER SYMPTOMS: \"Do you have any other symptoms?\" (e.g., lightheadedness)      no  10. PREGNANCY: \"Is there any chance you are " "pregnant?\" \"When was your last menstrual period?\"        N/a    Protocols used: Nosebleed-Adult-[place]    "

## 2025-02-05 NOTE — ED PROVIDER NOTES
Time reflects when diagnosis was documented in both MDM as applicable and the Disposition within this note       Time User Action Codes Description Comment    2025 10:33 PM Ashlyn Sweeney Add [R04.0] Epistaxis           ED Disposition       ED Disposition   Discharge    Condition   Stable    Date/Time    10:33 PM    Comment   Rekha Davila discharge to home/self care.                   Assessment & Plan       Medical Decision Making  Differential diagnosis includes but is not limited to epistaxis, hypertension, nasal trauma    Problems Addressed:  Epistaxis: acute illness or injury    Risk  OTC drugs.  Prescription drug management.        ED Course as of 25 Recheck of patient shows Rhino Rocket still in placed.  Discussed with patient starting antibiotics and if she cannot be seen within 24 hours she should return here to have nasal packing removed.       Medications   cephalexin (KEFLEX) capsule 500 mg (has no administration in time range)   oxymetazoline (AFRIN) 0.05 % nasal spray 2 spray (2 sprays Each Nare Given 25)       ED Risk Strat Scores                                              History of Present Illness       Chief Complaint   Patient presents with    Nose Bleed     Pt reports nose bleed that started an hour ago. Pt reports this is the 7th episode this month       Past Medical History:   Diagnosis Date    Depression     High cholesterol     Hypertension     Psychiatric disorder     Renal disorder       Past Surgical History:   Procedure Laterality Date    HYSTERECTOMY        History reviewed. No pertinent family history.   Social History     Tobacco Use    Smoking status: Former     Current packs/day: 0.00     Types: Cigarettes     Quit date:      Years since quittin.1    Smokeless tobacco: Never   Vaping Use    Vaping status: Never Used   Substance Use Topics    Alcohol use: Never    Drug use: Never      E-Cigarette/Vaping     E-Cigarette Use Never User       E-Cigarette/Vaping Substances      I have reviewed and agree with the history as documented.     64-year-old female comes in for evaluation of nosebleed.  Patient states she has had multiple nosebleeds since January.  She has not been seen or evaluated for them.  She states that usually start on the left side and resolved with pressure.  She came in tonight because she could not get her nose to stop bleeding.  Patient denies any aspirin or blood thinners.  Has not been seen by her PCP or ENT for this      History provided by:  Patient and medical records   used: No    Nose Bleed  Location:  L nare  Severity:  Moderate  Duration:  2 hours  Timing:  Constant  Progression:  Worsening  Chronicity:  New  Context: weather change    Context: not anticoagulants and not foreign body    Ineffective treatments:  Applying pressure  Associated symptoms: blood in oropharynx    Associated symptoms: no cough, no fever, no sore throat and no syncope    Risk factors: frequent nosebleeds    Risk factors: no change in medication and no recent nasal surgery        Review of Systems   Constitutional:  Negative for chills and fever.   HENT:  Positive for nosebleeds. Negative for ear pain and sore throat.    Eyes:  Negative for pain and visual disturbance.   Respiratory:  Negative for cough and shortness of breath.    Cardiovascular:  Negative for chest pain, palpitations and syncope.   Gastrointestinal:  Negative for abdominal pain and vomiting.   Genitourinary:  Negative for dysuria and hematuria.   Musculoskeletal:  Negative for arthralgias and back pain.   Skin:  Negative for color change and rash.   Neurological:  Negative for seizures and syncope.   All other systems reviewed and are negative.          Objective       ED Triage Vitals [02/04/25 2151]   Temp Pulse Blood Pressure Respirations SpO2 Patient Position - Orthostatic VS   -- 72 142/65 18 100 % Lying      Temp src Heart  Rate Source BP Location FiO2 (%) Pain Score    -- Monitor Right arm -- --      Vitals      Date and Time Temp Pulse SpO2 Resp BP Pain Score FACES Pain Rating User   02/04/25 2151 -- 72 100 % 18 142/65 -- -- RN            Physical Exam  Constitutional:       Appearance: She is well-developed.   HENT:      Head: Normocephalic and atraumatic.      Right Ear: External ear normal.      Left Ear: External ear normal.      Nose:      Right Nostril: No septal hematoma.      Left Nostril: Epistaxis present. No septal hematoma.   Eyes:      Conjunctiva/sclera: Conjunctivae normal.      Pupils: Pupils are equal, round, and reactive to light.   Neck:      Thyroid: No thyroid mass.      Vascular: No carotid bruit or JVD.      Trachea: Trachea normal.   Cardiovascular:      Rate and Rhythm: Normal rate and regular rhythm.      Pulses: Normal pulses.      Heart sounds: Normal heart sounds, S1 normal and S2 normal.   Pulmonary:      Effort: Pulmonary effort is normal.      Breath sounds: Normal breath sounds. No wheezing, rhonchi or rales.   Abdominal:      General: Bowel sounds are normal.      Palpations: Abdomen is soft.      Tenderness: There is no abdominal tenderness. There is no guarding or rebound.   Genitourinary:     Exam position: Supine.      Vagina: No bleeding.   Musculoskeletal:         General: Normal range of motion.      Cervical back: Normal range of motion and neck supple.   Lymphadenopathy:      Cervical: No cervical adenopathy.   Skin:     General: Skin is warm and dry.   Neurological:      Mental Status: She is alert and oriented to person, place, and time.      GCS: GCS eye subscore is 4. GCS verbal subscore is 5. GCS motor subscore is 6.      Cranial Nerves: No cranial nerve deficit.      Sensory: No sensory deficit.      Deep Tendon Reflexes: Reflexes are normal and symmetric.   Psychiatric:         Speech: Speech normal.         Behavior: Behavior normal. Behavior is cooperative.         Thought Content:  Thought content normal.         Results Reviewed       None            No orders to display       Epistaxis management    Date/Time: 2/4/2025 10:07 PM    Performed by: Ashlyn Sweeney DO  Authorized by: Ashlyn Sweeney DO  Universal Protocol:  Consent: Verbal consent obtained.  Consent given by: patient  Patient understanding: patient states understanding of the procedure being performed  Patient identity confirmed: verbally with patient    Patient location:  ED  Procedure details:     Treatment site:  L anterior    Hemostasis method:  Rhino rocket    Approach:  External    Spec Headlamp used: No      Treatment complexity:  Limited    Treatment episode: initial    Post-procedure details:     Assessment:  Bleeding stopped    Patient tolerance of procedure:  Tolerated well, no immediate complications      ED Medication and Procedure Management   Prior to Admission Medications   Prescriptions Last Dose Informant Patient Reported? Taking?   ALPRAZolam (XANAX) 1 mg tablet   Yes No   Sig: Take 1 mg by mouth 3 (three) times a day as needed for anxiety   Cholecalciferol (Vitamin D3) 50 MCG (2000 UT) TABS   Yes No   Sig: Take 2,000 Units by mouth daily   Patient not taking: Reported on 12/3/2024   FLUoxetine (PROzac) 40 MG capsule   Yes No   Sig: Take 40 mg by mouth daily   OLANZapine (ZyPREXA) 7.5 mg tablet   Yes No   Sig: Take 7.5 mg by mouth daily at bedtime   acetaminophen (TYLENOL) 650 mg CR tablet   Yes No   Sig: Take 650 mg by mouth every 8 (eight) hours as needed   Patient not taking: Reported on 12/3/2024   albuterol (Ventolin HFA) 90 mcg/act inhaler   No No   Sig: Inhale 2 puffs every 6 (six) hours as needed for wheezing or shortness of breath   amLODIPine (NORVASC) 10 mg tablet   No No   Sig: Take 1 tablet (10 mg total) by mouth daily Do not start before January 23, 2023.   benztropine (COGENTIN) 0.5 mg tablet   Yes No   Sig: TAKE 1 TABLET BY MOUTH DAILY AS NEEDED FOR CRAMPING OR TREMOR   cholecalciferol  (VITAMIN D3) 1,000 units tablet   Yes No   Sig: Take 2,000 Units by mouth daily   ferrous sulfate 325 (65 Fe) mg tablet   Yes No   Patient not taking: Reported on 12/3/2024   losartan (COZAAR) 50 mg tablet   Yes No   Sig: Take 1 tablet by mouth daily   metoprolol succinate (TOPROL-XL) 100 mg 24 hr tablet   Yes No   Sig: Take 100 mg by mouth daily   mirtazapine (REMERON) 30 mg tablet   No No   Sig: Take 1 tablet (30 mg total) by mouth daily at bedtime   mupirocin (BACTROBAN) 2 % ointment   Yes No   Sig: Apply topically 2 (two) times a day   oxyCODONE-acetaminophen (PERCOCET) 5-325 mg per tablet   Yes No   Sig: Take 1 tablet by mouth every 8 (eight) hours as needed   Patient not taking: Reported on 12/3/2024   rosuvastatin (CRESTOR) 5 mg tablet   Yes No   Sig: Take 5 mg by mouth daily   simvastatin (ZOCOR) 10 mg tablet   Yes No   Sig: Take 10 mg by mouth   sodium chloride 1 g tablet   No No   Sig: Take 1 tablet (1 g total) by mouth 3 (three) times a day with meals   triamcinolone (KENALOG) 0.1 % cream   Yes No   Sig: Apply topically if needed      Facility-Administered Medications: None     Current Discharge Medication List        START taking these medications    Details   cephalexin (KEFLEX) 250 mg capsule Take 1 capsule (250 mg total) by mouth 4 (four) times a day for 7 days  Qty: 28 capsule, Refills: 0    Associated Diagnoses: Epistaxis           CONTINUE these medications which have NOT CHANGED    Details   acetaminophen (TYLENOL) 650 mg CR tablet Take 650 mg by mouth every 8 (eight) hours as needed      albuterol (Ventolin HFA) 90 mcg/act inhaler Inhale 2 puffs every 6 (six) hours as needed for wheezing or shortness of breath  Qty: 18 g, Refills: 2    Comments: Substitution to a formulary equivalent within the same pharmaceutical class is authorized.  Associated Diagnoses: Pulmonary emphysema, unspecified emphysema type (HCC)      ALPRAZolam (XANAX) 1 mg tablet Take 1 mg by mouth 3 (three) times a day as needed  for anxiety      amLODIPine (NORVASC) 10 mg tablet Take 1 tablet (10 mg total) by mouth daily Do not start before January 23, 2023.  Qty: 30 tablet, Refills: 0    Associated Diagnoses: Benign essential hypertension      benztropine (COGENTIN) 0.5 mg tablet TAKE 1 TABLET BY MOUTH DAILY AS NEEDED FOR CRAMPING OR TREMOR      !! cholecalciferol (VITAMIN D3) 1,000 units tablet Take 2,000 Units by mouth daily      !! Cholecalciferol (Vitamin D3) 50 MCG (2000 UT) TABS Take 2,000 Units by mouth daily      ferrous sulfate 325 (65 Fe) mg tablet       FLUoxetine (PROzac) 40 MG capsule Take 40 mg by mouth daily      losartan (COZAAR) 50 mg tablet Take 1 tablet by mouth daily      metoprolol succinate (TOPROL-XL) 100 mg 24 hr tablet Take 100 mg by mouth daily      mirtazapine (REMERON) 30 mg tablet Take 1 tablet (30 mg total) by mouth daily at bedtime  Qty: 30 tablet, Refills: 0    Associated Diagnoses: Schizophrenia (HCC)      mupirocin (BACTROBAN) 2 % ointment Apply topically 2 (two) times a day      OLANZapine (ZyPREXA) 7.5 mg tablet Take 7.5 mg by mouth daily at bedtime      oxyCODONE-acetaminophen (PERCOCET) 5-325 mg per tablet Take 1 tablet by mouth every 8 (eight) hours as needed      rosuvastatin (CRESTOR) 5 mg tablet Take 5 mg by mouth daily      simvastatin (ZOCOR) 10 mg tablet Take 10 mg by mouth      sodium chloride 1 g tablet Take 1 tablet (1 g total) by mouth 3 (three) times a day with meals  Qty: 90 tablet, Refills: 0    Associated Diagnoses: Hyponatremia      triamcinolone (KENALOG) 0.1 % cream Apply topically if needed       !! - Potential duplicate medications found. Please discuss with provider.        No discharge procedures on file.  ED SEPSIS DOCUMENTATION   Time reflects when diagnosis was documented in both MDM as applicable and the Disposition within this note       Time User Action Codes Description Comment    2/4/2025 10:33 PM Ashlyn Sweeney [R04.0] Epistaxis                  Ashlyn Sweeney,  DO  02/04/25 2235

## 2025-02-05 NOTE — TELEPHONE ENCOUNTER
Dr Awan from the ED called in to make appointment for patient due to nosebleed. She will be sending over a ASAP referral for patient. Patient as nosebleed packing that needs to be removed.Please call patient to schedule appointment. Thank you

## 2025-02-07 ENCOUNTER — CONSULT (OUTPATIENT)
Dept: MULTI SPECIALTY CLINIC | Facility: CLINIC | Age: 65
End: 2025-02-07

## 2025-02-07 VITALS
SYSTOLIC BLOOD PRESSURE: 150 MMHG | HEIGHT: 64 IN | WEIGHT: 252 LBS | TEMPERATURE: 97.8 F | DIASTOLIC BLOOD PRESSURE: 81 MMHG | HEART RATE: 76 BPM | BODY MASS INDEX: 43.02 KG/M2

## 2025-02-07 DIAGNOSIS — R04.0 EPISTAXIS: Primary | ICD-10-CM

## 2025-02-07 PROCEDURE — 99203 OFFICE O/P NEW LOW 30 MIN: CPT | Performed by: OTOLARYNGOLOGY

## 2025-02-07 NOTE — PROGRESS NOTES
Consultation - Otolaryngology - Head and Neck Surgery  Facial Plastic and Reconstructive Surgery  Rekha Davila 64 y.o. female MRN: [id number]  Encounter: [id number]      Assessment/Plan:  1. Epistaxis          Left nasal packing removed, vessel cauterized over the anterior nasal septum - see proc note.  Advised to use vaseline BID to both nares  No nose blowing  F/U PRN    History of Present Illness   Physician Requesting Consult: Melisa Paredes MD   Reason for Consult / Principal Problem: Epistaxis  HPI: Rekha Davila is a 64 y.o. year old female who presents with left nasal packing placed on 25 at [facility]. The patient denies of any anticoagulation, is on HTN meds.     Review of systems:  10 Point ROS was performed and negative except as above or otherwise noted in the medical record.    Historical Information   Past Medical History:   Diagnosis Date    Depression     High cholesterol     Hypertension     Psychiatric disorder     Renal disorder      Past Surgical History:   Procedure Laterality Date    HYSTERECTOMY       Social History   Social History     Substance and Sexual Activity   Alcohol Use Never     Social History     Substance and Sexual Activity   Drug Use Never     Social History     Tobacco Use   Smoking Status Former    Current packs/day: 0.00    Types: Cigarettes    Quit date:     Years since quittin.1   Smokeless Tobacco Never     Family History:     Current Outpatient Medications on File Prior to Visit   Medication Sig    acetaminophen (TYLENOL) 650 mg CR tablet Take 650 mg by mouth every 8 (eight) hours as needed (Patient not taking: Reported on 12/3/2024)    albuterol (Ventolin HFA) 90 mcg/act inhaler Inhale 2 puffs every 6 (six) hours as needed for wheezing or shortness of breath    ALPRAZolam (XANAX) 1 mg tablet Take 1 mg by mouth 3 (three) times a day as needed for anxiety    amLODIPine (NORVASC) 10 mg tablet Take 1 tablet (10 mg total) by mouth daily Do not start  before January 23, 2023.    benztropine (COGENTIN) 0.5 mg tablet TAKE 1 TABLET BY MOUTH DAILY AS NEEDED FOR CRAMPING OR TREMOR    cephalexin (KEFLEX) 250 mg capsule Take 1 capsule (250 mg total) by mouth 4 (four) times a day for 7 days    cholecalciferol (VITAMIN D3) 1,000 units tablet Take 2,000 Units by mouth daily    Cholecalciferol (Vitamin D3) 50 MCG (2000 UT) TABS Take 2,000 Units by mouth daily (Patient not taking: Reported on 12/3/2024)    ferrous sulfate 325 (65 Fe) mg tablet  (Patient not taking: Reported on 12/3/2024)    FLUoxetine (PROzac) 40 MG capsule Take 40 mg by mouth daily    losartan (COZAAR) 50 mg tablet Take 1 tablet by mouth daily    metoprolol succinate (TOPROL-XL) 100 mg 24 hr tablet Take 100 mg by mouth daily    mirtazapine (REMERON) 30 mg tablet Take 1 tablet (30 mg total) by mouth daily at bedtime    mupirocin (BACTROBAN) 2 % ointment Apply topically 2 (two) times a day    OLANZapine (ZyPREXA) 7.5 mg tablet Take 7.5 mg by mouth daily at bedtime    oxyCODONE-acetaminophen (PERCOCET) 5-325 mg per tablet Take 1 tablet by mouth every 8 (eight) hours as needed (Patient not taking: Reported on 12/3/2024)    rosuvastatin (CRESTOR) 5 mg tablet Take 5 mg by mouth daily    simvastatin (ZOCOR) 10 mg tablet Take 10 mg by mouth    sodium chloride 1 g tablet Take 1 tablet (1 g total) by mouth 3 (three) times a day with meals    triamcinolone (KENALOG) 0.1 % cream Apply topically if needed     No current facility-administered medications on file prior to visit.       Allergies   Allergen Reactions    Benadryl [Diphenhydramine] Shortness Of Breath    Black [place] Allergy Skin Test - Food Allergy Other (See Comments) and Swelling     Lips swelling   Throat feels sore    Esomeprazole Other (See Comments)     Stomach ache    Hydrocodone-Acetaminophen GI Intolerance and Other (See Comments)     Per patient can take but not the recommended dosage      Latex Itching and Other (See Comments)     Raw skin     Meperidine GI Intolerance and Other (See Comments)     Hot, nervous, shaking      Nuts - Food Allergy Swelling     Lips swelling  Throat feels sore    [place] Swelling, Throat Swelling and Lip Swelling     Lips swelling   Throat feels sore       Vitals:    02/07/25 1313   BP: 150/81   Pulse: 76   Temp: 97.8 °F (36.6 °C)       Physical Exam   Constitutional: Well-developed and well-nourished, no apparent distress, non-toxic appearance. Cooperative, able to hear and answer questions without difficulty.    Voice: Normal voice quality.  Head: Normocephalic, atraumatic.    Face: Symmetric, no edema, no sinus tenderness.  Eyes: Vision grossly intact.  Ears: External ears normal.    Nose: Left nasal cavity packed with nasal packing. Right nasal cavity with no active oozing/bleeding.  Oral cavity: Dentition intact.  Mucosa moist, lips without lesions or masses.  Tongue mobile, floor of mouth soft and flat.  Hard palate intact.  No masses or lesions.   Oropharynx: Uvula is midline, soft palate intact without lesion or mass.  Oropharyngeal inlet without obstruction.  Tonsils unremarkable.  Posterior pharyngeal wall clear. No masses or lesions.  Neck: Normal laryngeal elevation with swallow.  Trachea midline.  No masses or lesions. No palpable adenopathy.    Anterior rhinoscopic examination and bleeding control of left nasal anterior septum  The left  nasal cavity were decongested with lidocaine and oxymetazoline spray.  Bilateral nasal endoscopy was performed as follows:  Results: look to examination  A bleeding vessel noted on left aspect of anterior nasal septum, vessels were cauterized using silver nitrate, and care was taken to limit extent of cautery to minimize risk of septal perforation.  Subsequent agitation of the cauterized site with cotton tipped swabs did not produce any active bleeding, and bacitracin ointment was applied.  The patient tolerated the procedure well.      Imaging Studies: None indicated.     Lab  "Results: CBC: No results found for: \"WBC\", \"HGB\", \"HCT\", \"MCV\", \"PLT\", \"ADJUSTEDWBC\", \"RBC\", \"MCH\", \"MCHC\", \"RDW\", \"MPV\", \"NRBC\", CMP: No results found for: \"SODIUM\", \"K\", \"CL\", \"CO2\", \"ANIONGAP\", \"BUN\", \"CREATININE\", \"GLUCOSE\", \"CALCIUM\", \"AST\", \"ALT\", \"ALKPHOS\", \"PROT\", \"BILITOT\", \"EGFR\", Coags: No results found for: \"PT\", \"PTT\", \"INR\"    Records reviewed: ED visit.   "